# Patient Record
Sex: MALE | Race: OTHER | HISPANIC OR LATINO | URBAN - METROPOLITAN AREA
[De-identification: names, ages, dates, MRNs, and addresses within clinical notes are randomized per-mention and may not be internally consistent; named-entity substitution may affect disease eponyms.]

---

## 2019-05-22 ENCOUNTER — EMERGENCY (EMERGENCY)
Facility: HOSPITAL | Age: 39
LOS: 1 days | Discharge: HOME | End: 2019-05-22
Attending: EMERGENCY MEDICINE | Admitting: EMERGENCY MEDICINE
Payer: OTHER MISCELLANEOUS

## 2019-05-22 VITALS
TEMPERATURE: 98 F | SYSTOLIC BLOOD PRESSURE: 179 MMHG | RESPIRATION RATE: 16 BRPM | OXYGEN SATURATION: 98 % | DIASTOLIC BLOOD PRESSURE: 115 MMHG | HEART RATE: 78 BPM

## 2019-05-22 VITALS
SYSTOLIC BLOOD PRESSURE: 165 MMHG | TEMPERATURE: 98 F | DIASTOLIC BLOOD PRESSURE: 94 MMHG | OXYGEN SATURATION: 99 % | HEART RATE: 79 BPM | RESPIRATION RATE: 16 BRPM

## 2019-05-22 LAB
ALBUMIN SERPL ELPH-MCNC: 4.6 G/DL — SIGNIFICANT CHANGE UP (ref 3.3–5)
ALP SERPL-CCNC: 106 U/L — SIGNIFICANT CHANGE UP (ref 40–120)
ALT FLD-CCNC: 74 U/L — HIGH (ref 4–41)
ANION GAP SERPL CALC-SCNC: 17 MMO/L — HIGH (ref 7–14)
AST SERPL-CCNC: 62 U/L — HIGH (ref 4–40)
BASE EXCESS BLDV CALC-SCNC: 4.1 MMOL/L — SIGNIFICANT CHANGE UP
BASOPHILS # BLD AUTO: 0.09 K/UL — SIGNIFICANT CHANGE UP (ref 0–0.2)
BASOPHILS NFR BLD AUTO: 0.8 % — SIGNIFICANT CHANGE UP (ref 0–2)
BILIRUB SERPL-MCNC: 0.3 MG/DL — SIGNIFICANT CHANGE UP (ref 0.2–1.2)
BLOOD GAS VENOUS - CREATININE: 0.68 MG/DL — SIGNIFICANT CHANGE UP (ref 0.5–1.3)
BUN SERPL-MCNC: 14 MG/DL — SIGNIFICANT CHANGE UP (ref 7–23)
CALCIUM SERPL-MCNC: 10.1 MG/DL — SIGNIFICANT CHANGE UP (ref 8.4–10.5)
CHLORIDE BLDV-SCNC: 103 MMOL/L — SIGNIFICANT CHANGE UP (ref 96–108)
CHLORIDE SERPL-SCNC: 99 MMOL/L — SIGNIFICANT CHANGE UP (ref 98–107)
CO2 SERPL-SCNC: 26 MMOL/L — SIGNIFICANT CHANGE UP (ref 22–31)
CREAT SERPL-MCNC: 0.75 MG/DL — SIGNIFICANT CHANGE UP (ref 0.5–1.3)
EOSINOPHIL # BLD AUTO: 0.2 K/UL — SIGNIFICANT CHANGE UP (ref 0–0.5)
EOSINOPHIL NFR BLD AUTO: 1.7 % — SIGNIFICANT CHANGE UP (ref 0–6)
GAS PNL BLDV: 142 MMOL/L — SIGNIFICANT CHANGE UP (ref 136–146)
GLUCOSE BLDV-MCNC: 101 MG/DL — HIGH (ref 70–99)
GLUCOSE SERPL-MCNC: 104 MG/DL — HIGH (ref 70–99)
HCO3 BLDV-SCNC: 27 MMOL/L — SIGNIFICANT CHANGE UP (ref 20–27)
HCT VFR BLD CALC: 47.9 % — SIGNIFICANT CHANGE UP (ref 39–50)
HCT VFR BLDV CALC: 48.5 % — SIGNIFICANT CHANGE UP (ref 39–51)
HGB BLD-MCNC: 15.4 G/DL — SIGNIFICANT CHANGE UP (ref 13–17)
HGB BLDV-MCNC: 15.8 G/DL — SIGNIFICANT CHANGE UP (ref 13–17)
IMM GRANULOCYTES NFR BLD AUTO: 0.4 % — SIGNIFICANT CHANGE UP (ref 0–1.5)
LACTATE BLDV-MCNC: 2.6 MMOL/L — HIGH (ref 0.5–2)
LYMPHOCYTES # BLD AUTO: 3.54 K/UL — HIGH (ref 1–3.3)
LYMPHOCYTES # BLD AUTO: 30.6 % — SIGNIFICANT CHANGE UP (ref 13–44)
MCHC RBC-ENTMCNC: 30.1 PG — SIGNIFICANT CHANGE UP (ref 27–34)
MCHC RBC-ENTMCNC: 32.2 % — SIGNIFICANT CHANGE UP (ref 32–36)
MCV RBC AUTO: 93.7 FL — SIGNIFICANT CHANGE UP (ref 80–100)
MONOCYTES # BLD AUTO: 0.71 K/UL — SIGNIFICANT CHANGE UP (ref 0–0.9)
MONOCYTES NFR BLD AUTO: 6.1 % — SIGNIFICANT CHANGE UP (ref 2–14)
NEUTROPHILS # BLD AUTO: 6.97 K/UL — SIGNIFICANT CHANGE UP (ref 1.8–7.4)
NEUTROPHILS NFR BLD AUTO: 60.4 % — SIGNIFICANT CHANGE UP (ref 43–77)
NRBC # FLD: 0 K/UL — SIGNIFICANT CHANGE UP (ref 0–0)
PCO2 BLDV: 49 MMHG — SIGNIFICANT CHANGE UP (ref 41–51)
PH BLDV: 7.39 PH — SIGNIFICANT CHANGE UP (ref 7.32–7.43)
PLATELET # BLD AUTO: 346 K/UL — SIGNIFICANT CHANGE UP (ref 150–400)
PMV BLD: 10.4 FL — SIGNIFICANT CHANGE UP (ref 7–13)
PO2 BLDV: 68 MMHG — HIGH (ref 35–40)
POTASSIUM BLDV-SCNC: 3.8 MMOL/L — SIGNIFICANT CHANGE UP (ref 3.4–4.5)
POTASSIUM SERPL-MCNC: 4.2 MMOL/L — SIGNIFICANT CHANGE UP (ref 3.5–5.3)
POTASSIUM SERPL-SCNC: 4.2 MMOL/L — SIGNIFICANT CHANGE UP (ref 3.5–5.3)
PROT SERPL-MCNC: 8 G/DL — SIGNIFICANT CHANGE UP (ref 6–8.3)
RBC # BLD: 5.11 M/UL — SIGNIFICANT CHANGE UP (ref 4.2–5.8)
RBC # FLD: 12 % — SIGNIFICANT CHANGE UP (ref 10.3–14.5)
SAO2 % BLDV: 93.4 % — HIGH (ref 60–85)
SODIUM SERPL-SCNC: 142 MMOL/L — SIGNIFICANT CHANGE UP (ref 135–145)
WBC # BLD: 11.56 K/UL — HIGH (ref 3.8–10.5)
WBC # FLD AUTO: 11.56 K/UL — HIGH (ref 3.8–10.5)

## 2019-05-22 PROCEDURE — 76604 US EXAM CHEST: CPT | Mod: 26

## 2019-05-22 PROCEDURE — 99284 EMERGENCY DEPT VISIT MOD MDM: CPT

## 2019-05-22 RX ORDER — SODIUM CHLORIDE 9 MG/ML
1000 INJECTION INTRAMUSCULAR; INTRAVENOUS; SUBCUTANEOUS ONCE
Refills: 0 | Status: COMPLETED | OUTPATIENT
Start: 2019-05-22 | End: 2019-05-22

## 2019-05-22 RX ORDER — AMPICILLIN SODIUM AND SULBACTAM SODIUM 250; 125 MG/ML; MG/ML
3 INJECTION, POWDER, FOR SUSPENSION INTRAMUSCULAR; INTRAVENOUS ONCE
Refills: 0 | Status: DISCONTINUED | OUTPATIENT
Start: 2019-05-22 | End: 2019-05-26

## 2019-05-22 RX ORDER — AMPICILLIN SODIUM AND SULBACTAM SODIUM 250; 125 MG/ML; MG/ML
3 INJECTION, POWDER, FOR SUSPENSION INTRAMUSCULAR; INTRAVENOUS EVERY 6 HOURS
Refills: 0 | Status: DISCONTINUED | OUTPATIENT
Start: 2019-05-22 | End: 2019-05-22

## 2019-05-22 RX ADMIN — AMPICILLIN SODIUM AND SULBACTAM SODIUM 200 GRAM(S): 250; 125 INJECTION, POWDER, FOR SUSPENSION INTRAMUSCULAR; INTRAVENOUS at 16:26

## 2019-05-22 RX ADMIN — SODIUM CHLORIDE 1000 MILLILITER(S): 9 INJECTION INTRAMUSCULAR; INTRAVENOUS; SUBCUTANEOUS at 17:08

## 2019-05-22 NOTE — ED ADULT TRIAGE NOTE - CHIEF COMPLAINT QUOTE
states " I got bit by a hoarse on my right breast area a week ago and my pressure is high " states " I got bit by a hoarse on my right breast area a week ago and my pressure is high " denies head ache. denies any other [id number]. states " I got bit by a horse on my right breast area a week ago and my pressure is high " denies head ache. denies any other [id number].

## 2019-05-22 NOTE — ED PROVIDER NOTE - NSFOLLOWUPINSTRUCTIONS_ED_ALL_ED_FT
PLEASE MAKE SURE THAT YOU APPLY WARM COMPRESSES TO THE AREA MULTIPLE TIMES A DAY, TAKE ANTIBIOTICS (AUGMENTIN) AS WELL AS PAIN MEDICATIONS AS NEEDED; FOLLOW UP WITH SURGERY CLINIC AT [facility] WITHIN NEXT 3-4DAYS; CALL [phone];

## 2019-05-22 NOTE — CONSULT NOTE ADULT - SUBJECTIVE AND OBJECTIVE BOX
General Surgery Consult      Consulting surgical team: B  Consulting attending: Dr. Bacon       HPI: Vinicius Santos is a 38 y.o. man with history of HTN who presented to the ED on 5/22 complaining of swelling and pain to the right side of his anterior chest wall at the site of a horse bite.    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 The patient states that he knows the horse and it is up-to-date on all vaccinations. The patient denies that the bite produced any blood or broke the skin.     Since that time, the patient states he has had pain at the area and that the degree of associated swelling is improving. The patient denies any fever, chills, fatigue, or other symptoms.       PAST MEDICAL HISTORY:  No pertinent past medical history      PAST SURGICAL HISTORY:  No significant past surgical history      MEDICATIONS:  None      ALLERGIES:  No Known Allergies      VITALS & I/Os:  Vital Signs Last 24 Hrs  T(C): 36.8 (22 May 2019 16:37), Max: 36.8 (22 May 2019 16:37)  T(F): 98.2 (22 May 2019 16:37), Max: 98.2 (22 May 2019 16:37)  HR: 79 (22 May 2019 16:37) (78 - 79)  BP: 165/94 (22 May 2019 16:37) (160/110 - 179/115)  RR: 16 (22 May 2019 16:37) (16 - 16)  SpO2: 99% (22 May 2019 16:37) (98% - 99%)      PHYSICAL EXAM:  General: No acute distress  Respiratory: Nonlabored  Cardiovascular: normotensive, regular rate  Chest: ecchymosis inferior to right areola, tenderness superior to right areola with underlying induration and no overlying skin changes, no palpable fluctuance   Extremities: Warm      LABS:                        15.4   11.56 )-----------( 346      ( 22 May 2019 15:58 )             47.9     05-22    142  |  99  |  14  ----------------------------<  104<H>  4.2   |  26  |  0.75    Ca    10.1      22 May 2019 15:58    TPro  8.0  /  Alb  4.6  /  TBili  0.3  /  DBili  x   /  AST  62<H>  /  ALT  74<H>  /  AlkPhos  106  05-22    Lactate:  05-22 @ 15:58  2.6      IMAGING:  US Chest (05.22.19 @ 16:43)   There is a fluid collection in the right breast with surrounding   inflammation. The fluid collection measures 3.7 x 0.9 x 3.5 cm.    IMPRESSION:  Fluid collection and inflammation involving the right breast.

## 2019-05-22 NOTE — ED PROVIDER NOTE - PHYSICAL EXAMINATION
Rt breast - 5cm x 5cm area of induration with ecchymotic skin changes, no pointing, no spontaneous drainage or drainage from the nipple

## 2019-05-22 NOTE — CONSULT NOTE ADULT - ASSESSMENT
Vinicius Santos is a 38 y.o. man with history of HTN who presented to the ED on 5/22 complaining of swelling and pain to the right side of his anterior chest wall at the site of a horse bite. Fluid collection seen on US likely hematoma vs seroma; no sign of infection.    Plan:  - Given traumatic nature of fluid collection and lack of infectious symptoms, no indication for drainage  - NSAIDs  - Cold compresses  - No contraindication to discharge from a surgical perspective  - Plan discussed with Dr. Jordi Parker, PGY2  [id number]

## 2019-05-22 NOTE — ED PROVIDER NOTE - PROGRESS NOTE DETAILS
YANICK James - pt seen by surgery - the collection is not abscess ut resolving hematoma - will dc with warm compresses, AUgmentin, NSAIDs, surgery clinic f/u within 1 week/ YANICK James - pt seen by surgery - the collection is not abscess ut resolving hematoma - will dc with warm compresses, AUgmentin, NSAIDs, surgery clinic f/u within 1 week; pt a;lso given general clinic f/u to start seeing pcp, bp control stressed to the pt;

## 2019-05-22 NOTE — ED PROVIDER NOTE - ATTENDING CONTRIBUTION TO CARE
DR. WHITLEY, ATTENDING MD-  I performed a face to face bedside interview with patient regarding history of present illness, review of symptoms and past medical history. I completed an independent physical exam.  I have discussed patient's plan of care with PA.   Documentation as above in the note.    37 y/o male h/o htn no meds, etoh abuse 6-7 beers daily here for evaluation of horse bite.  Per pt, he works at [employer].  A horse bit him on his right breast 10 days prior.  Since that time has had worsening pain.  No fever.  No drainage from nipple or breast.  Taking nsaids with some relief.  Tetanus utd.  Last etoh last night.  Denies f/c, ha, neck stiffness, cp, sob, cough, abd pain, n/v/d, dysuria, rash.  Afebrile, vs wnl, anxious, ctabil, s1s2 rrr no m/r/g, gynecomastia, right breast ttp with induration, minimal overlying erythema, no fluctuance appreciated, abd soft non ttp no r/g, no cva tenderness b/l, no leg swelling b/l, no rash.  Breast abscess vs mastitis, no clinical signs of etoh w/d at this time, will obtain labs, u/s breast, give augmentin, reassess.

## 2019-05-22 NOTE — ED PROVIDER NOTE - OBJECTIVE STATEMENT
37 YO M told has HTN (does not take medication) here s/p horse bite. States a horse bit his right breast Sourav May 12th, after giving pt an oral medication. Horse has been acting his normal baseline behavior. Never bit anyone else. Vaccinations for horse is up to date. Horse is part of [employer]. Here due to increased pain. Denies fever, chills, HA, or CP. Tetanus vaccine was 7 years ago. Naproxen taken at home 500 mg yesterday. NKDA. ETOH 6 to 7 beers daily. Last drink was yesterday. Sometimes develops shakes hen he does not drink. Does not follow with a primary doctor. 39 YO M told has HTN (does not take medication) here s/p horse bite. States a horse bit his right breast Sourav May 12th, after giving pt an oral medication. Horse has been acting his normal baseline behavior. Never bit anyone else. Vaccinations for horse is up to date. Horse is part of [employer]. Here due to increased pain. Denies fever, chills, HA, or CP. Tetanus vaccine was 7 years ago. Naproxen taken at home 500 mg yesterday. NKDA. ETOH 6 to 7 beers daily. Last drink was yesterday. Sometimes develops shakes hen he does not drink. Does not follow with a primary doctor.  ID # [id number]. 37 YO M told has HTN (does not take medication) here s/p horse bite. States a horse bit his right breast Sourav May 12th, after giving pt an oral medication (pt works at [employer]). Horse has been acting his normal baseline behavior. Never bit anyone else. Vaccinations for horse is up to date. Horse is part of [employer]. Here due to increased pain. Denies fever, chills, HA, or CP. Tetanus vaccine was 7 years ago. Naproxen taken at home 500 mg yesterday. NKDA. ETOH 6 to 7 beers daily. Last drink was yesterday. . Does not follow with a primary doctor.  ID # [id number]. (-) fever, chills, draine from the nipple, denies hx of immunocompromised state;

## 2019-05-22 NOTE — ED PROVIDER NOTE - CLINICAL SUMMARY MEDICAL DECISION MAKING FREE TEXT BOX
s/p animal bite to RT breast, now with increased pain, concern for abscess - labs, US, pain control; pt noted to be hypertensive in the ER - has hx of HTN but since no PMD never started medications; denies cp, sob, palpitations, HA or dizziness; daily etoh Drinker, last drink 8pm last night, no signs/symptoms of withdrawal noted;

## 2020-12-28 ENCOUNTER — INPATIENT (INPATIENT)
Facility: HOSPITAL | Age: 40
LOS: 1 days | Discharge: HOME | End: 2020-12-30
Attending: INTERNAL MEDICINE | Admitting: INTERNAL MEDICINE
Payer: MEDICAID

## 2020-12-28 VITALS
OXYGEN SATURATION: 98 % | WEIGHT: 229.94 LBS | RESPIRATION RATE: 18 BRPM | SYSTOLIC BLOOD PRESSURE: 157 MMHG | DIASTOLIC BLOOD PRESSURE: 113 MMHG | HEIGHT: 65 IN | HEART RATE: 90 BPM | TEMPERATURE: 99 F

## 2020-12-28 DIAGNOSIS — F10.230 ALCOHOL DEPENDENCE WITH WITHDRAWAL, UNCOMPLICATED: ICD-10-CM

## 2020-12-28 DIAGNOSIS — E87.6 HYPOKALEMIA: ICD-10-CM

## 2020-12-28 DIAGNOSIS — K76.0 FATTY (CHANGE OF) LIVER, NOT ELSEWHERE CLASSIFIED: ICD-10-CM

## 2020-12-28 DIAGNOSIS — R79.89 OTHER SPECIFIED ABNORMAL FINDINGS OF BLOOD CHEMISTRY: ICD-10-CM

## 2020-12-28 PROBLEM — Z78.9 OTHER SPECIFIED HEALTH STATUS: Chronic | Status: ACTIVE | Noted: 2019-05-22

## 2020-12-28 LAB
ALBUMIN SERPL ELPH-MCNC: 3.6 G/DL — SIGNIFICANT CHANGE UP (ref 3.3–5)
ALBUMIN SERPL ELPH-MCNC: 4 G/DL — SIGNIFICANT CHANGE UP (ref 3.3–5)
ALP SERPL-CCNC: 109 U/L — SIGNIFICANT CHANGE UP (ref 40–120)
ALP SERPL-CCNC: 125 U/L — HIGH (ref 40–120)
ALT FLD-CCNC: 162 U/L — HIGH (ref 12–78)
ALT FLD-CCNC: 192 U/L — HIGH (ref 12–78)
ANION GAP SERPL CALC-SCNC: 10 MMOL/L — SIGNIFICANT CHANGE UP (ref 5–17)
ANION GAP SERPL CALC-SCNC: 8 MMOL/L — SIGNIFICANT CHANGE UP (ref 5–17)
APPEARANCE UR: CLEAR — SIGNIFICANT CHANGE UP
APTT BLD: 31.8 SEC — SIGNIFICANT CHANGE UP (ref 27.5–35.5)
AST SERPL-CCNC: 243 U/L — HIGH (ref 15–37)
AST SERPL-CCNC: 324 U/L — HIGH (ref 15–37)
BACTERIA # UR AUTO: ABNORMAL
BILIRUB DIRECT SERPL-MCNC: 1.13 MG/DL — HIGH (ref 0.05–0.2)
BILIRUB INDIRECT FLD-MCNC: 1.1 MG/DL — HIGH (ref 0.2–1)
BILIRUB SERPL-MCNC: 2.2 MG/DL — HIGH (ref 0.2–1.2)
BILIRUB SERPL-MCNC: 2.6 MG/DL — HIGH (ref 0.2–1.2)
BILIRUB UR-MCNC: NEGATIVE — SIGNIFICANT CHANGE UP
BUN SERPL-MCNC: 7 MG/DL — SIGNIFICANT CHANGE UP (ref 7–23)
BUN SERPL-MCNC: 7 MG/DL — SIGNIFICANT CHANGE UP (ref 7–23)
CALCIUM SERPL-MCNC: 9.1 MG/DL — SIGNIFICANT CHANGE UP (ref 8.5–10.1)
CALCIUM SERPL-MCNC: 9.5 MG/DL — SIGNIFICANT CHANGE UP (ref 8.5–10.1)
CHLORIDE SERPL-SCNC: 92 MMOL/L — LOW (ref 96–108)
CHLORIDE SERPL-SCNC: 97 MMOL/L — SIGNIFICANT CHANGE UP (ref 96–108)
CO2 SERPL-SCNC: 29 MMOL/L — SIGNIFICANT CHANGE UP (ref 22–31)
CO2 SERPL-SCNC: 29 MMOL/L — SIGNIFICANT CHANGE UP (ref 22–31)
COLOR SPEC: YELLOW — SIGNIFICANT CHANGE UP
CREAT SERPL-MCNC: 0.73 MG/DL — SIGNIFICANT CHANGE UP (ref 0.5–1.3)
CREAT SERPL-MCNC: 0.8 MG/DL — SIGNIFICANT CHANGE UP (ref 0.5–1.3)
DIFF PNL FLD: NEGATIVE — SIGNIFICANT CHANGE UP
EPI CELLS # UR: SIGNIFICANT CHANGE UP
ETHANOL SERPL-MCNC: <10 MG/DL — SIGNIFICANT CHANGE UP (ref 0–10)
GLUCOSE SERPL-MCNC: 119 MG/DL — HIGH (ref 70–99)
GLUCOSE SERPL-MCNC: 92 MG/DL — SIGNIFICANT CHANGE UP (ref 70–99)
GLUCOSE UR QL: NEGATIVE MG/DL — SIGNIFICANT CHANGE UP
HCT VFR BLD CALC: 48.8 % — SIGNIFICANT CHANGE UP (ref 39–50)
HGB BLD-MCNC: 16.4 G/DL — SIGNIFICANT CHANGE UP (ref 13–17)
INR BLD: 1.09 RATIO — SIGNIFICANT CHANGE UP (ref 0.88–1.16)
KETONES UR-MCNC: NEGATIVE — SIGNIFICANT CHANGE UP
LEUKOCYTE ESTERASE UR-ACNC: NEGATIVE — SIGNIFICANT CHANGE UP
LIDOCAIN IGE QN: 360 U/L — SIGNIFICANT CHANGE UP (ref 73–393)
MAGNESIUM SERPL-MCNC: 1.5 MG/DL — LOW (ref 1.6–2.6)
MAGNESIUM SERPL-MCNC: 1.8 MG/DL — SIGNIFICANT CHANGE UP (ref 1.6–2.6)
MCHC RBC-ENTMCNC: 31.5 PG — SIGNIFICANT CHANGE UP (ref 27–34)
MCHC RBC-ENTMCNC: 33.6 GM/DL — SIGNIFICANT CHANGE UP (ref 32–36)
MCV RBC AUTO: 93.8 FL — SIGNIFICANT CHANGE UP (ref 80–100)
NITRITE UR-MCNC: NEGATIVE — SIGNIFICANT CHANGE UP
NRBC # BLD: 0 /100 WBCS — SIGNIFICANT CHANGE UP (ref 0–0)
PH UR: 8 — SIGNIFICANT CHANGE UP (ref 5–8)
PHOSPHATE SERPL-MCNC: 2.7 MG/DL — SIGNIFICANT CHANGE UP (ref 2.5–4.5)
PHOSPHATE SERPL-MCNC: 3.5 MG/DL — SIGNIFICANT CHANGE UP (ref 2.5–4.5)
PLATELET # BLD AUTO: 151 K/UL — SIGNIFICANT CHANGE UP (ref 150–400)
POTASSIUM SERPL-MCNC: 3.2 MMOL/L — LOW (ref 3.5–5.3)
POTASSIUM SERPL-MCNC: 3.5 MMOL/L — SIGNIFICANT CHANGE UP (ref 3.5–5.3)
POTASSIUM SERPL-SCNC: 3.2 MMOL/L — LOW (ref 3.5–5.3)
POTASSIUM SERPL-SCNC: 3.5 MMOL/L — SIGNIFICANT CHANGE UP (ref 3.5–5.3)
PROT SERPL-MCNC: 7.7 GM/DL — SIGNIFICANT CHANGE UP (ref 6–8.3)
PROT SERPL-MCNC: 8.6 GM/DL — HIGH (ref 6–8.3)
PROT UR-MCNC: 15 MG/DL
PROTHROM AB SERPL-ACNC: 12.6 SEC — SIGNIFICANT CHANGE UP (ref 10.6–13.6)
RBC # BLD: 5.2 M/UL — SIGNIFICANT CHANGE UP (ref 4.2–5.8)
RBC # FLD: 11.9 % — SIGNIFICANT CHANGE UP (ref 10.3–14.5)
SARS-COV-2 RNA SPEC QL NAA+PROBE: SIGNIFICANT CHANGE UP
SODIUM SERPL-SCNC: 131 MMOL/L — LOW (ref 135–145)
SODIUM SERPL-SCNC: 134 MMOL/L — LOW (ref 135–145)
SP GR SPEC: 1.01 — SIGNIFICANT CHANGE UP (ref 1.01–1.02)
TROPONIN I SERPL-MCNC: <.015 NG/ML — SIGNIFICANT CHANGE UP (ref 0.01–0.04)
UROBILINOGEN FLD QL: 4 MG/DL
WBC # BLD: 4.31 K/UL — SIGNIFICANT CHANGE UP (ref 3.8–10.5)
WBC # FLD AUTO: 4.31 K/UL — SIGNIFICANT CHANGE UP (ref 3.8–10.5)
WBC UR QL: SIGNIFICANT CHANGE UP

## 2020-12-28 PROCEDURE — 74177 CT ABD & PELVIS W/CONTRAST: CPT | Mod: 26,MA

## 2020-12-28 PROCEDURE — 71045 X-RAY EXAM CHEST 1 VIEW: CPT | Mod: 26

## 2020-12-28 PROCEDURE — 99223 1ST HOSP IP/OBS HIGH 75: CPT

## 2020-12-28 PROCEDURE — 93010 ELECTROCARDIOGRAM REPORT: CPT

## 2020-12-28 PROCEDURE — 99285 EMERGENCY DEPT VISIT HI MDM: CPT

## 2020-12-28 RX ORDER — PANTOPRAZOLE SODIUM 20 MG/1
40 TABLET, DELAYED RELEASE ORAL DAILY
Refills: 0 | Status: DISCONTINUED | OUTPATIENT
Start: 2020-12-28 | End: 2020-12-30

## 2020-12-28 RX ORDER — FAMOTIDINE 10 MG/ML
20 INJECTION INTRAVENOUS ONCE
Refills: 0 | Status: COMPLETED | OUTPATIENT
Start: 2020-12-28 | End: 2020-12-28

## 2020-12-28 RX ORDER — DEXTROSE MONOHYDRATE, SODIUM CHLORIDE, AND POTASSIUM CHLORIDE 50; .745; 4.5 G/1000ML; G/1000ML; G/1000ML
1000 INJECTION, SOLUTION INTRAVENOUS
Refills: 0 | Status: DISCONTINUED | OUTPATIENT
Start: 2020-12-28 | End: 2020-12-30

## 2020-12-28 RX ORDER — AZITHROMYCIN 500 MG/1
1000 TABLET, FILM COATED ORAL ONCE
Refills: 0 | Status: COMPLETED | OUTPATIENT
Start: 2020-12-28 | End: 2020-12-28

## 2020-12-28 RX ORDER — ACETAMINOPHEN 500 MG
650 TABLET ORAL EVERY 6 HOURS
Refills: 0 | Status: DISCONTINUED | OUTPATIENT
Start: 2020-12-28 | End: 2020-12-30

## 2020-12-28 RX ORDER — CEFTRIAXONE 500 MG/1
250 INJECTION, POWDER, FOR SOLUTION INTRAMUSCULAR; INTRAVENOUS ONCE
Refills: 0 | Status: COMPLETED | OUTPATIENT
Start: 2020-12-28 | End: 2020-12-28

## 2020-12-28 RX ORDER — SODIUM CHLORIDE 9 MG/ML
1000 INJECTION INTRAMUSCULAR; INTRAVENOUS; SUBCUTANEOUS ONCE
Refills: 0 | Status: COMPLETED | OUTPATIENT
Start: 2020-12-28 | End: 2020-12-28

## 2020-12-28 RX ADMIN — CEFTRIAXONE 250 MILLIGRAM(S): 500 INJECTION, POWDER, FOR SOLUTION INTRAMUSCULAR; INTRAVENOUS at 06:19

## 2020-12-28 RX ADMIN — PANTOPRAZOLE SODIUM 40 MILLIGRAM(S): 20 TABLET, DELAYED RELEASE ORAL at 15:22

## 2020-12-28 RX ADMIN — Medication 30 MILLILITER(S): at 06:19

## 2020-12-28 RX ADMIN — FAMOTIDINE 20 MILLIGRAM(S): 10 INJECTION INTRAVENOUS at 06:19

## 2020-12-28 RX ADMIN — AZITHROMYCIN 1000 MILLIGRAM(S): 500 TABLET, FILM COATED ORAL at 06:19

## 2020-12-28 RX ADMIN — Medication 50 MILLIGRAM(S): at 18:55

## 2020-12-28 RX ADMIN — Medication 50 MILLIGRAM(S): at 15:21

## 2020-12-28 RX ADMIN — DEXTROSE MONOHYDRATE, SODIUM CHLORIDE, AND POTASSIUM CHLORIDE 80 MILLILITER(S): 50; .745; 4.5 INJECTION, SOLUTION INTRAVENOUS at 23:04

## 2020-12-28 RX ADMIN — SODIUM CHLORIDE 1000 MILLILITER(S): 9 INJECTION INTRAMUSCULAR; INTRAVENOUS; SUBCUTANEOUS at 06:19

## 2020-12-28 RX ADMIN — Medication 2 MILLIGRAM(S): at 06:19

## 2020-12-28 NOTE — ED PROVIDER NOTE - CLINICAL SUMMARY MEDICAL DECISION MAKING FREE TEXT BOX
patient clinically in etoh withdrawal, will treat with ativan. patient clinically in etoh withdrawal, will treat with ativan. consider pancreatitis, alcohol induced gastritis, appendicitis. empiric treatment for STD. patient declining HIV testing. as patinet has lost sense of smell will eval for covid, non tahcypenic or hypoxic in ED.

## 2020-12-28 NOTE — ED PROVIDER NOTE - PROGRESS NOTE DETAILS
ct show hepatic steatosis, pt admitted for alcohol withdrawal pt signed out to me from dr trevizo, pt presented with alcohol withdrawal (ciwa-9) and abdominal pain, given ativan, ct pending

## 2020-12-28 NOTE — ED ADULT NURSE REASSESSMENT NOTE - NS ED NURSE REASSESS COMMENT FT1
Received pt in stable condition. Pt denies any  complaints however pt is displaying hand tremors. continue to  monitor pt.

## 2020-12-28 NOTE — ED ADULT NURSE NOTE - OBJECTIVE STATEMENT
pt c/o abd pain, (points to epigastric and also points RLQ),  pt unable to eat/drink x 10days.  N/V/D.  denies blood in diarrhea,  "sometimes urine is orange".  "my lungs feel vincent strange".  +cough with white phelgm.  denies chest pain pt c/o abd pain, (points to epigastric and also points RLQ),  pt unable to eat/drink x 10days.  N/V/D.  denies blood in diarrhea,  "sometimes urine is orange".  "my lungs feel vincent strange".  +cough with white phelgm.  denies chest pain.  pt admits to doing cocaine 2 weeks ago.

## 2020-12-28 NOTE — ED PROVIDER NOTE - OBJECTIVE STATEMENT
40m pmhx H. pylori with progressively worsening abdominal pain. points toward his epigastrum and RLQ. +nausea and vomiting. no blood in vomit. +diarrhea. no blood in diarrhea. no cp or sob or fever. familia also reports occasional dysuria and says it feels like he has chlamydia again. drinks daily and has not had a drink since last night.

## 2020-12-28 NOTE — ED ADULT TRIAGE NOTE - CHIEF COMPLAINT QUOTE
pt c/o abd pain, N/V/D x 5 days,  points to epigastric area.  +cough/white phlegm,  c/o no sense of smell,  +sense of taste,  denies fever,  pt states he had covid in April and June.  pt took tylenol 1 tab at 0430hrs pt c/o abd pain, N/V/D x 5 days,  points to epigastric area.  +cough/white phlegm,  c/o no sense of smell,  +sense of taste,  denies fever,  pt states he had covid in April and June.  pt took tylenol 1 tab at 0430hrs.  pt also admits to being a daily drinker, 2-3 whiskeys daily, last drink Sunday night.

## 2020-12-28 NOTE — ED PROVIDER NOTE - ENMT, MLM
Airway patent, Nasal mucosa clear. Mouth with normal mucosa with high frequency tongue fasciculations

## 2020-12-28 NOTE — ED ADULT NURSE NOTE - CHIEF COMPLAINT QUOTE
pt c/o abd pain, N/V/D x 5 days,  points to epigastric area.  +cough/white phlegm,  c/o no sense of smell,  +sense of taste,  denies fever,  pt states he had covid in April and June.  pt took tylenol 1 tab at 0430hrs.  pt also admits to being a daily drinker, 2-3 whiskeys daily, last drink Sunday night.

## 2020-12-28 NOTE — ED ADULT NURSE REASSESSMENT NOTE - NS ED NURSE REASSESS COMMENT FT1
Patient alert and oriented x 4; able to make needs known. Patient without complaint. Safety checks completed. Patient safety maintained. Patient turned and positioned self independently. IV site assessed  and remain within defined limits. Continue to monitor patient.

## 2020-12-28 NOTE — H&P ADULT - NSHPPHYSICALEXAM_GEN_ALL_CORE
ICU Vital Signs Last 24 Hrs  T(C): 37 (28 Dec 2020 11:38), Max: 37.3 (28 Dec 2020 05:31)  T(F): 98.6 (28 Dec 2020 11:38), Max: 99.2 (28 Dec 2020 05:31)  HR: 94 (28 Dec 2020 11:38) (80 - 95)  BP: 158/101 (28 Dec 2020 11:38) (130/90 - 159/97)  BP(mean): --  ABP: --  ABP(mean): --  RR: 18 (28 Dec 2020 11:38) (16 - 18)  SpO2: 95% (28 Dec 2020 11:38) (95% - 98%)  GENERAL: NAD well-developed  HEAD:  Atraumatic, Normocephalic  EYES: EOMI, PERRLA, conjunctiva and sclera clear  ENMT: No tonsillar erythema, exudates, or enlargement; Moist mucous membranes, Good dentition, No lesions  NECK: Supple, No JVD, Normal thyroid  NERVOUS SYSTEM:  Alert & Oriented X3, Good concentration; Motor Strength 5/5 B/L upper and lower extremities; DTRs 2+ intact and symmetric  CHEST/LUNG: Clear to percussion bilaterally; No rales, rhonchi, wheezing, or rubs  HEART: Regular rate and rhythm; No murmurs, rubs, or gallops  ABDOMEN: Soft, Nontender, Nondistended; Bowel sounds present  EXTREMITIES:  2+ Peripheral Pulses, No clubbing, cyanosis, or edema  LYMPH: No lymphadenopathy   SKIN: No rashes or lesions

## 2020-12-28 NOTE — ED ADULT NURSE NOTE - NS ED NURSE LEVEL OF CONSCIOUSNESS ORIENTATION
central line located in the superior vena cava/no pneumothorax
central line located in the superior vena cava/no pneumothorax
Oriented - self; Oriented - place; Oriented - time

## 2020-12-28 NOTE — ED ADULT NURSE NOTE - ED STAT RN HANDOFF DETAILS 3
Report received from ROBBY Rodriguez at 7pm. Assessment available on [facility]. will continue to monitor.

## 2020-12-29 LAB
ANION GAP SERPL CALC-SCNC: 8 MMOL/L — SIGNIFICANT CHANGE UP (ref 5–17)
BUN SERPL-MCNC: 10 MG/DL — SIGNIFICANT CHANGE UP (ref 7–23)
CALCIUM SERPL-MCNC: 9 MG/DL — SIGNIFICANT CHANGE UP (ref 8.5–10.1)
CHLORIDE SERPL-SCNC: 99 MMOL/L — SIGNIFICANT CHANGE UP (ref 96–108)
CO2 SERPL-SCNC: 29 MMOL/L — SIGNIFICANT CHANGE UP (ref 22–31)
CREAT SERPL-MCNC: 0.7 MG/DL — SIGNIFICANT CHANGE UP (ref 0.5–1.3)
GLUCOSE SERPL-MCNC: 79 MG/DL — SIGNIFICANT CHANGE UP (ref 70–99)
HCT VFR BLD CALC: 45 % — SIGNIFICANT CHANGE UP (ref 39–50)
HGB BLD-MCNC: 15.4 G/DL — SIGNIFICANT CHANGE UP (ref 13–17)
MAGNESIUM SERPL-MCNC: 2.1 MG/DL — SIGNIFICANT CHANGE UP (ref 1.6–2.6)
MCHC RBC-ENTMCNC: 32.5 PG — SIGNIFICANT CHANGE UP (ref 27–34)
MCHC RBC-ENTMCNC: 34.2 GM/DL — SIGNIFICANT CHANGE UP (ref 32–36)
MCV RBC AUTO: 94.9 FL — SIGNIFICANT CHANGE UP (ref 80–100)
NRBC # BLD: 0 /100 WBCS — SIGNIFICANT CHANGE UP (ref 0–0)
PHOSPHATE SERPL-MCNC: 3.5 MG/DL — SIGNIFICANT CHANGE UP (ref 2.5–4.5)
PLATELET # BLD AUTO: 143 K/UL — LOW (ref 150–400)
POTASSIUM SERPL-MCNC: 3.4 MMOL/L — LOW (ref 3.5–5.3)
POTASSIUM SERPL-SCNC: 3.4 MMOL/L — LOW (ref 3.5–5.3)
RBC # BLD: 4.74 M/UL — SIGNIFICANT CHANGE UP (ref 4.2–5.8)
RBC # FLD: 12.1 % — SIGNIFICANT CHANGE UP (ref 10.3–14.5)
SARS-COV-2 IGG SERPL QL IA: NEGATIVE — SIGNIFICANT CHANGE UP
SARS-COV-2 IGM SERPL IA-ACNC: 0.17 INDEX — SIGNIFICANT CHANGE UP
SODIUM SERPL-SCNC: 136 MMOL/L — SIGNIFICANT CHANGE UP (ref 135–145)
WBC # BLD: 5.19 K/UL — SIGNIFICANT CHANGE UP (ref 3.8–10.5)
WBC # FLD AUTO: 5.19 K/UL — SIGNIFICANT CHANGE UP (ref 3.8–10.5)

## 2020-12-29 PROCEDURE — 99232 SBSQ HOSP IP/OBS MODERATE 35: CPT

## 2020-12-29 RX ADMIN — Medication 50 MILLIGRAM(S): at 22:08

## 2020-12-29 RX ADMIN — DEXTROSE MONOHYDRATE, SODIUM CHLORIDE, AND POTASSIUM CHLORIDE 80 MILLILITER(S): 50; .745; 4.5 INJECTION, SOLUTION INTRAVENOUS at 22:08

## 2020-12-29 RX ADMIN — Medication 50 MILLIGRAM(S): at 08:25

## 2020-12-29 RX ADMIN — Medication 50 MILLIGRAM(S): at 14:34

## 2020-12-29 RX ADMIN — Medication 50 MILLIGRAM(S): at 01:01

## 2020-12-29 RX ADMIN — DEXTROSE MONOHYDRATE, SODIUM CHLORIDE, AND POTASSIUM CHLORIDE 80 MILLILITER(S): 50; .745; 4.5 INJECTION, SOLUTION INTRAVENOUS at 17:52

## 2020-12-29 RX ADMIN — PANTOPRAZOLE SODIUM 40 MILLIGRAM(S): 20 TABLET, DELAYED RELEASE ORAL at 12:55

## 2020-12-29 NOTE — PROGRESS NOTE ADULT - SUBJECTIVE AND OBJECTIVE BOX
Patient is a 40y old  Male who presents with a chief complaint of     INTERVAL HPI/OVERNIGHT EVENTS: no events on bipap overnight, 2/2 sleep apnea     MEDICATIONS  (STANDING):  chlordiazePOXIDE   Oral   chlordiazePOXIDE 50 milliGRAM(s) Oral every 8 hours  pantoprazole  Injectable 40 milliGRAM(s) IV Push daily  sodium chloride 0.9% with potassium chloride 20 mEq/L 1000 milliLiter(s) (80 mL/Hr) IV Continuous <Continuous>    MEDICATIONS  (PRN):  acetaminophen   Tablet .. 650 milliGRAM(s) Oral every 6 hours PRN Temp greater or equal to 38C (100.4F), Mild Pain (1 - 3)      Allergies    No Known Allergies    Intolerances         Vital Signs Last 24 Hrs  T(C): 36.7 (29 Dec 2020 11:01), Max: 37.1 (28 Dec 2020 15:52)  T(F): 98 (29 Dec 2020 11:01), Max: 98.8 (28 Dec 2020 15:52)  HR: 76 (29 Dec 2020 11:01) (74 - 81)  BP: 127/84 (29 Dec 2020 11:01) (126/98 - 146/99)  BP(mean): --  RR: 17 (29 Dec 2020 11:01) (17 - 20)  SpO2: 95% (29 Dec 2020 11:01) (94% - 99%)    PHYSICAL EXAM:  GENERAL: NAD, well-groomed, well-developed  HEAD:  Atraumatic, Normocephalic  EYES: EOMI, PERRLA, conjunctiva and sclera clear  ENMT: No tonsillar erythema, exudates, or enlargement; Moist mucous membranes, Good dentition, No lesions  NECK: Supple, No JVD, Normal thyroid  NERVOUS SYSTEM:  Alert & Oriented X3, Good concentration; Motor Strength 5/5 B/L upper and lower extremities; DTRs 2+ intact and symmetric  CHEST/LUNG: Clear to percussion bilaterally; No rales, rhonchi, wheezing, or rubs  HEART: Regular rate and rhythm; No murmurs, rubs, or gallops  ABDOMEN: Soft, Nontender, Nondistended; Bowel sounds present  EXTREMITIES:  2+ Peripheral Pulses, No clubbing, cyanosis, or edema  LYMPH: No lymphadenopathy noted  SKIN: No rashes or lesions    LABS:                        15.4   5.19  )-----------( 143      ( 29 Dec 2020 05:37 )             45.0         136  |  99  |  10  ----------------------------<  79  3.4<L>   |  29  |  0.70    Ca    9.0      29 Dec 2020 05:37  Phos  3.5       Mg     2.1         TPro  7.7  /  Alb  3.6  /  TBili  2.2<H>  /  DBili  1.13<H>  /  AST  243<H>  /  ALT  162<H>  /  AlkPhos  109      PT/INR - ( 28 Dec 2020 06:29 )   PT: 12.6 sec;   INR: 1.09 ratio         PTT - ( 28 Dec 2020 06:29 )  PTT:31.8 sec  Urinalysis Basic - ( 28 Dec 2020 15:32 )    Color: Yellow / Appearance: Clear / S.010 / pH: x  Gluc: x / Ketone: Negative  / Bili: Negative / Urobili: 4 mg/dL   Blood: x / Protein: 15 mg/dL / Nitrite: Negative   Leuk Esterase: Negative / RBC: x / WBC 0-2   Sq Epi: x / Non Sq Epi: Occasional / Bacteria: Occasional      CAPILLARY BLOOD GLUCOSE          RADIOLOGY & ADDITIONAL TESTS:    Imaging Personally Reviewed:  [ X] YES  [ ] NO    Consultant(s) Notes Reviewed:  [ X] YES  [ ] NO    Care Discussed with Consultants/Other Providers [X ] YES  [ ] NO

## 2020-12-30 ENCOUNTER — TRANSCRIPTION ENCOUNTER (OUTPATIENT)
Age: 40
End: 2020-12-30

## 2020-12-30 VITALS
SYSTOLIC BLOOD PRESSURE: 124 MMHG | RESPIRATION RATE: 20 BRPM | DIASTOLIC BLOOD PRESSURE: 84 MMHG | TEMPERATURE: 98 F | OXYGEN SATURATION: 97 % | HEART RATE: 87 BPM

## 2020-12-30 PROCEDURE — 99239 HOSP IP/OBS DSCHRG MGMT >30: CPT

## 2020-12-30 RX ORDER — INFLUENZA VIRUS VACCINE 15; 15; 15; 15 UG/.5ML; UG/.5ML; UG/.5ML; UG/.5ML
0.5 SUSPENSION INTRAMUSCULAR ONCE
Refills: 0 | Status: DISCONTINUED | OUTPATIENT
Start: 2020-12-30 | End: 2020-12-30

## 2020-12-30 RX ADMIN — DEXTROSE MONOHYDRATE, SODIUM CHLORIDE, AND POTASSIUM CHLORIDE 80 MILLILITER(S): 50; .745; 4.5 INJECTION, SOLUTION INTRAVENOUS at 05:45

## 2020-12-30 RX ADMIN — Medication 50 MILLIGRAM(S): at 05:45

## 2020-12-30 NOTE — DISCHARGE NOTE PROVIDER - HOSPITAL COURSE
40m pmhx H. pylori with progressively worsening abdominal pain. points toward his epigastrum and RLQ. +nausea and vomiting. no blood in vomit. +diarrhea. no blood in diarrhea. no cp or sob or fever. familia also reports occasional dysuria and says it feels like he has chlamydia again. drinks daily and has not had a drink since last night.     (28 Dec 2020 13:02)  In hospital pt admitted to telemetry unit. Placed on ciwa protocol with librium taper for etoh withdrawal. CIWA 3 today, pt for discharge to home with librium for 2 more days.

## 2020-12-30 NOTE — PROGRESS NOTE ADULT - PROBLEM/PLAN-2
CHIEF COMPLAINT  Well Child  Here with Mom. No issues. Brother may have Autism. Starting . Bam Granado is a 10 y.o. female who presents with Mother     ROS  All other review of systems negative, except for those noted. PAST MEDICAL HISTORY    No past medical history on file. MEDICATIONS  No current outpatient prescriptions on file. No current facility-administered medications for this visit. ALLERGIES  No Known Allergies    PHYSICAL EXAM  Vital Signs:    Vitals:    08/09/18 0834   BP: 90/50   Site: Right Arm   Position: Sitting   Cuff Size: Child   Pulse: 80   Weight: 48 lb (21.8 kg)   Height: 43\" (109.2 cm)     Wt Readings from Last 3 Encounters:   08/09/18 48 lb (21.8 kg) (76 %, Z= 0.71)*   11/22/17 44 lb (20 kg) (77 %, Z= 0.74)*   08/21/17 45 lb 6.4 oz (20.6 kg) (87 %, Z= 1.14)*     * Growth percentiles are based on CDC 2-20 Years data. Ht Readings from Last 3 Encounters:   08/09/18 43\" (109.2 cm) (26 %, Z= -0.65)*   11/22/17 41.5\" (105.4 cm) (34 %, Z= -0.42)*   08/21/17 41\" (104.1 cm) (37 %, Z= -0.32)*     * Growth percentiles are based on CDC 2-20 Years data. Body mass index is 18.25 kg/m². 94 %ile (Z= 1.54) based on CDC 2-20 Years BMI-for-age data using vitals from 8/9/2018.  76 %ile (Z= 0.71) based on CDC 2-20 Years weight-for-age data using vitals from 8/9/2018.  26 %ile (Z= -0.65) based on CDC 2-20 Years stature-for-age data using vitals from 8/9/2018. Constitutional:  Healthy. HEENT:  Normocephalic, Atraumatic, EACs and TMS intact and normal. Hearing grossly normal. Nasal mucosa, septum, turbinates normal. Lips, teeth and gums normal. Oropharynx normal. No cervical adenopathy. Neck:  Supple. Thyroid not enlarged and no masses. Cardiovascular:  Regular rate and rhythm. Normal S1 and S2. No murmurs, rubs or gallops. No edema. Respiratory: Normal breath sounds, No respiratory distress, No wheezing, No chest tenderness.    Abdomen: Soft,
DISPLAY PLAN FREE TEXT
DISPLAY PLAN FREE TEXT

## 2020-12-30 NOTE — PROGRESS NOTE ADULT - PROBLEM SELECTOR PLAN 1
ciwa protocol    c/w prn bipap for sleep apnea ciwa protocol, will finish last two librium days at home. c/w prn bipap for sleep apnea.  Discharge home later today.

## 2020-12-30 NOTE — DISCHARGE NOTE PROVIDER - NSDCCPCAREPLAN_GEN_ALL_CORE_FT
PRINCIPAL DISCHARGE DIAGNOSIS  Diagnosis: Alcohol withdrawal  Assessment and Plan of Treatment:       SECONDARY DISCHARGE DIAGNOSES  Diagnosis: Hepatic steatosis  Assessment and Plan of Treatment:

## 2020-12-30 NOTE — DISCHARGE NOTE NURSING/CASE MANAGEMENT/SOCIAL WORK - PATIENT PORTAL LINK FT
You can access the FollowMyHealth Patient Portal offered by [facility] by registering at the following website: [email or web address]. By joining [employer]’s FollowMyHealth portal, you will also be able to view your health information using other applications (apps) compatible with our system.

## 2020-12-30 NOTE — PATIENT PROFILE ADULT - NSPRESCRUSEDDRG_GEN_A_NUR
11 month old patient called into ED for positive blood culture results from 1/16. Pt well appearing tolerating PO, last void 0900, febrile to 102 at 1100, 6th day of fever, mom game motrin at 1100. Vaccination up to date. Recent travel to [place], returned on 1/2/2019.
No

## 2020-12-30 NOTE — PROGRESS NOTE ADULT - SUBJECTIVE AND OBJECTIVE BOX
INTERVAL HPI/OVERNIGHT EVENTS:  Pt seen and examined at bedside.     Allergies/Intolerance: No Known Allergies      MEDICATIONS  (STANDING):  chlordiazePOXIDE   Oral   chlordiazePOXIDE 50 milliGRAM(s) Oral every 12 hours  influenza   Vaccine 0.5 milliLiter(s) IntraMuscular once  pantoprazole  Injectable 40 milliGRAM(s) IV Push daily  sodium chloride 0.9% with potassium chloride 20 mEq/L 1000 milliLiter(s) (80 mL/Hr) IV Continuous <Continuous>    MEDICATIONS  (PRN):  acetaminophen   Tablet .. 650 milliGRAM(s) Oral every 6 hours PRN Temp greater or equal to 38C (100.4F), Mild Pain (1 - 3)        ROS: all systems reviewed and wnl      PHYSICAL EXAMINATION:  Vital Signs Last 24 Hrs  T(C): 36.8 (30 Dec 2020 05:00), Max: 37.3 (29 Dec 2020 23:45)  T(F): 98.2 (30 Dec 2020 05:00), Max: 99.1 (29 Dec 2020 23:45)  HR: 90 (30 Dec 2020 08:42) (65 - 92)  BP: 139/87 (30 Dec 2020 05:00) (127/84 - 145/97)  BP(mean): --  RR: 18 (30 Dec 2020 05:00) (17 - 18)  SpO2: 97% (30 Dec 2020 08:42) (95% - 98%)  CAPILLARY BLOOD GLUCOSE            GENERAL:   NECK: supple, No JVD  CHEST/LUNG: clear to auscultation bilaterally; no rales, rhonchi, or wheezing b/l  HEART: normal S1, S2  ABDOMEN: BS+, soft, ND, NT   EXTREMITIES:  pulses palpable; no clubbing, cyanosis, or edema b/l LEs  SKIN: no rashes or lesions      LABS:                        15.4   5.19  )-----------( 143      ( 29 Dec 2020 05:37 )             45.0     12-    136  |  99  |  10  ----------------------------<  79  3.4<L>   |  29  |  0.70    Ca    9.0      29 Dec 2020 05:37  Phos  3.5     12-  Mg     2.1     -    TPro  7.7  /  Alb  3.6  /  TBili  2.2<H>  /  DBili  1.13<H>  /  AST  243<H>  /  ALT  162<H>  /  AlkPhos  109  12-28      Urinalysis Basic - ( 28 Dec 2020 15:32 )    Color: Yellow / Appearance: Clear / S.010 / pH: x  Gluc: x / Ketone: Negative  / Bili: Negative / Urobili: 4 mg/dL   Blood: x / Protein: 15 mg/dL / Nitrite: Negative   Leuk Esterase: Negative / RBC: x / WBC 0-2   Sq Epi: x / Non Sq Epi: Occasional / Bacteria: Occasional             INTERVAL HPI/OVERNIGHT EVENTS:  Pt seen and examined at bedside.     Allergies/Intolerance: No Known Allergies      MEDICATIONS  (STANDING):  chlordiazePOXIDE   Oral   chlordiazePOXIDE 50 milliGRAM(s) Oral every 12 hours  influenza   Vaccine 0.5 milliLiter(s) IntraMuscular once  pantoprazole  Injectable 40 milliGRAM(s) IV Push daily  sodium chloride 0.9% with potassium chloride 20 mEq/L 1000 milliLiter(s) (80 mL/Hr) IV Continuous <Continuous>    MEDICATIONS  (PRN):  acetaminophen   Tablet .. 650 milliGRAM(s) Oral every 6 hours PRN Temp greater or equal to 38C (100.4F), Mild Pain (1 - 3)        ROS: all systems reviewed and wnl      PHYSICAL EXAMINATION:  Vital Signs Last 24 Hrs  T(C): 36.8 (30 Dec 2020 05:00), Max: 37.3 (29 Dec 2020 23:45)  T(F): 98.2 (30 Dec 2020 05:00), Max: 99.1 (29 Dec 2020 23:45)  HR: 90 (30 Dec 2020 08:42) (65 - 92)  BP: 139/87 (30 Dec 2020 05:00) (127/84 - 145/97)  BP(mean): --  RR: 18 (30 Dec 2020 05:00) (17 - 18)  SpO2: 97% (30 Dec 2020 08:42) (95% - 98%)  CAPILLARY BLOOD GLUCOSE            GENERAL: stable, NAD, eager for discharge, no tremors or withdrawal   NECK: supple, No JVD  CHEST/LUNG: clear to auscultation bilaterally; no rales, rhonchi, or wheezing b/l  HEART: normal S1, S2  ABDOMEN: BS+, soft, ND, NT   EXTREMITIES:  pulses palpable; no clubbing, cyanosis, or edema b/l LEs  SKIN: no rashes or lesions      LABS:                        15.4   5.19  )-----------( 143      ( 29 Dec 2020 05:37 )             45.0         136  |  99  |  10  ----------------------------<  79  3.4<L>   |  29  |  0.70    Ca    9.0      29 Dec 2020 05:37  Phos  3.5     12-  Mg     2.1     12-29    TPro  7.7  /  Alb  3.6  /  TBili  2.2<H>  /  DBili  1.13<H>  /  AST  243<H>  /  ALT  162<H>  /  AlkPhos  109  12-28      Urinalysis Basic - ( 28 Dec 2020 15:32 )    Color: Yellow / Appearance: Clear / S.010 / pH: x  Gluc: x / Ketone: Negative  / Bili: Negative / Urobili: 4 mg/dL   Blood: x / Protein: 15 mg/dL / Nitrite: Negative   Leuk Esterase: Negative / RBC: x / WBC 0-2   Sq Epi: x / Non Sq Epi: Occasional / Bacteria: Occasional

## 2020-12-30 NOTE — PROGRESS NOTE ADULT - ASSESSMENT
40 years old male with alchohol abuse with elevated ciwa with hypokalemia and elevated lfts      
40 years old male with alchohol abuse with elevated ciwa with hypokalemia and elevated lfts

## 2021-01-02 DIAGNOSIS — K76.0 FATTY (CHANGE OF) LIVER, NOT ELSEWHERE CLASSIFIED: ICD-10-CM

## 2021-01-02 DIAGNOSIS — F10.139 ALCOHOL ABUSE WITH WITHDRAWAL, UNSPECIFIED: ICD-10-CM

## 2021-01-02 DIAGNOSIS — E87.6 HYPOKALEMIA: ICD-10-CM

## 2021-01-02 DIAGNOSIS — G47.30 SLEEP APNEA, UNSPECIFIED: ICD-10-CM

## 2021-03-30 NOTE — ED PROVIDER NOTE - CROS ED CARDIOVAS ALL NEG
[facility] PHYSICIANS  [facility] [address]  [address] [id number]  Dept: [phone]  OF VISIT:  3/30/21        History and Physical    Martin Swanson    :  1957  CHIEF COMPLAINT:    Chief Complaint   Patient presents with    Annual Exam     Prev Pap: 3/2/20 Neg/HPV Neg; Prev Shara: 20 WNL; Prev Dexa:  WNL                    HPI :   Martin Swanson is a 58 y.o. female    The patient was seen and examined. Per the patient bowels are regular. She has novoiding complaints. She denies any bloating as well as vaginal discharge. No vaginal dryness or hot flushes. Thinking about retiring in the next few years.    _____________________________________________________________________  Past Medical History:   Diagnosis Date    HTN (hypertension)     ON RX    Hyperlipidemia     ON RX     IBS (irritable bowel syndrome)     Ovarian cyst     reabsorbed    Rotator cuff syndrome of right shoulder     ON GOING HAS HAD SURGERY AND INJECTIONS    Wears glasses                                                                    Past Surgical History:   Procedure Laterality Date     SECTION      CHOLECYSTECTOMY, LAPAROSCOPIC      COLONOSCOPY      negative    FINGER TRIGGER RELEASE Right 2017    middle    MYRINGOTOMY AND TYMPANOSTOMY TUBE PLACEMENT Right 02/10/2017    MYRINGOTOMY AND TYMPANOSTOMY TUBE PLACEMENT Right 2018    MYRINGOTOMY AND TYMPANOSTOMY TUBE PLACEMENT Right 2018    MYRINGOTOMY WITH T- TUBE INSERTION, NASAL ENDOSCOPY WITH DEBRIDEMENT performed by Sarah Fitzgerald MD at [address] Right 2018    OTHER SURGICAL HISTORY  02/10/2017    FESS    [place] INCISE FINGER TENDON SHEATH Right 2017    RIGHT MIDDLE FINGER TRIGGER RELEASE (3080 TABLE) performed by Nini Harrell DO at [address] SCOPE,BX/RMV POLYP/DEBRID N/A 2/10/2017    ENDOSCOPIC SINUS SURGERY WITH STEALTH NAVIGATION, MAXILLARY ANTROSTOMY, ETHMOIDECTOMY, FRONTAL SINUS EXPLORATION, SPHENOIDOTOMY, BILATERAL TURBINATE REDUCTION, RIGHT EAR TUBE INSERTION performed by Ayah Euceda MD at [address] Right     1 SURGERY AND SEVERAL INJECTIONS    TONSILLECTOMY  1966    WISDOM TOOTH EXTRACTION  1988    4 TEETH REMOVED     Family History   Problem Relation Age of Onset    High Blood Pressure Mother     COPD Mother     Cancer Maternal Cousin 46        Colon CA    Cancer Maternal Grandmother         ovarian     High Blood Pressure Maternal Grandmother     Other Brother         CEREBAL PALSY   OF NATURAL CAUSES     Social History     Tobacco Use   Smoking Status Never Smoker   Smokeless Tobacco Never Used     Social History     Substance and Sexual Activity   Alcohol Use Yes    Frequency: Monthly or less    Drinks per session: 1 or 2    Comment: BEER WINE OR MIXED DRINKS 1 OR 2 A WEEK     Current Outpatient Medications   Medication Sig Dispense Refill    calcium carbonate 600 MG TABS tablet Take 1 tablet by mouth daily      VITAMIN D PO Take 1 tablet by mouth daily      cyclobenzaprine (FLEXERIL) 10 MG tablet Take 10 mg by mouth as needed.  hydrochlorothiazide (HYDRODIURIL) 25 MG tablet Take 25 mg by mouth daily.  ibuprofen (ADVIL;MOTRIN) 800 MG tablet Take 800 mg by mouth every 6 hours as needed LAST DOSE 2018      loratadine (CLARITIN) 10 MG tablet Take 10 mg by mouth as needed Last dose 2018       No current facility-administered medications for this visit. Facility-Administered Medications Ordered in Other Visits   Medication Dose Route Frequency Provider Last Rate Last Admin    Bupivacaine HCl (PF) (MARCAINE) 0.75 % injection 225 mg  30 mL Intradermal Once Laquetta Infield, DO           Allergies:  Seasonal    Gynecologic History:  Patient's last menstrual period was 2008 (within days).   Sexually Active: No  STD History: No  Pap smear history: negative      OB History    Para Term  AB Living   3 3 0 0 0 3   SAB TAB Ectopic Molar Multiple Live Births   0 0 0 0 0 3     ______________________________________________________________________  REVIEW OF SYSTEMS:  Review of Systems   Constitutional: Negative for chills and fever. HENT: Negative for hearing loss. Respiratory: Negative for cough and wheezing. Cardiovascular: Negative for chest pain and palpitations. Gastrointestinal: Negative for abdominal pain, constipation, diarrhea, nausea and vomiting. Genitourinary: Negative for dysuria, frequency and urgency. Musculoskeletal: Negative for myalgias. Skin: Negative for rash. Neurological: Negative for dizziness, weakness and headaches. Hematological: Does not bruise/bleed easily. Psychiatric/Behavioral: Negative for suicidal ideas. /84 (Position: Sitting, Cuff Size: Medium Adult)   Ht 5' (1.524 m)   Wt 169 lb (76.7 kg)   LMP 2008 (Within Days)   BMI 33.01 kg/m²                    Physical Exam:     Physical Exam  Constitutional:       Appearance: She is well-developed. HENT:      Head: Normocephalic. Neck:      Musculoskeletal: Normal range of motion. Thyroid: No thyromegaly. Vascular: No JVD. Cardiovascular:      Rate and Rhythm: Normal rate and regular rhythm. Pulmonary:      Effort: Pulmonary effort is normal. No respiratory distress. Breath sounds: Normal breath sounds. No wheezing or rales. Chest:      Chest wall: No tenderness. Breasts: Breasts are symmetrical.         Right: No inverted nipple, mass, nipple discharge, skin change or tenderness. Left: No inverted nipple, mass, nipple discharge, skin change or tenderness. Abdominal:      General: Bowel sounds are normal. There is no distension. Palpations: Abdomen is soft. Tenderness: There is no abdominal tenderness. Genitourinary:     Exam position: Supine.       Labia: Right: No rash, tenderness, lesion or injury. Left: No rash, tenderness, lesion or injury. Vagina: No signs of injury and foreign body. No vaginal discharge, erythema, tenderness or bleeding. Cervix: No cervical motion tenderness, discharge or friability. Uterus: Not deviated, not enlarged, not fixed and not tender. Adnexa:         Right: No mass, tenderness or fullness. Left: No mass, tenderness or fullness. Musculoskeletal: Normal range of motion. Lymphadenopathy:      Cervical: No cervical adenopathy. Skin:     General: Skin is warm and dry. Neurological:      Mental Status: She is alert and oriented to person, place, and time. Deep Tendon Reflexes: Reflexes are normal and symmetric. Psychiatric:         Behavior: Behavior normal.         Thought Content: Thought content normal.         Judgment: Judgment normal.             ASSESSMENT:        58 y.o. Female; Annual   Diagnosis Orders   1. Encounter for gynecological examination  PAP SMEAR   2. Encounter for screening mammogram for breast cancer  ALISA DIGITAL SCREEN W OR WO CAD BILATERAL     Return in about 1 year (around 3/30/2022) for annual exam.              Hereditary Breast, Ovarian,Colon and Uterine Cancer screening Done. Tobacco & Secondary smoke risks reviewed; instructed oncessation and avoidance    PLAN:  - Pap collected, pt prefers yearly. -Menopause symptoms discussed   - Screening mammogram discussed and advised yearly if normal starting at age 36.  - Routine health maintenance per patients PCP.     Electronically signed by Aimee Roe MD on 3/30/2021at 4:13 PM  Terry negative...

## 2021-04-25 PROBLEM — U07.1 COVID-19: Chronic | Status: ACTIVE | Noted: 2020-12-28

## 2021-05-05 ENCOUNTER — APPOINTMENT (OUTPATIENT)
Dept: DISASTER EMERGENCY | Facility: OTHER | Age: 41
End: 2021-05-05

## 2021-05-23 ENCOUNTER — EMERGENCY (EMERGENCY)
Facility: HOSPITAL | Age: 41
LOS: 0 days | Discharge: HOME | End: 2021-05-23
Attending: EMERGENCY MEDICINE
Payer: OTHER MISCELLANEOUS

## 2021-05-23 VITALS
WEIGHT: 197.98 LBS | HEIGHT: 65 IN | TEMPERATURE: 98 F | OXYGEN SATURATION: 97 % | SYSTOLIC BLOOD PRESSURE: 142 MMHG | HEART RATE: 92 BPM | DIASTOLIC BLOOD PRESSURE: 95 MMHG | RESPIRATION RATE: 18 BRPM

## 2021-05-23 DIAGNOSIS — M79.602 PAIN IN LEFT ARM: ICD-10-CM
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
## 2021-05-23 DIAGNOSIS — S62.600A FRACTURE OF UNSPECIFIED PHALANX OF RIGHT INDEX FINGER, INITIAL ENCOUNTER FOR CLOSED FRACTURE: ICD-10-CM

## 2021-05-23 DIAGNOSIS — Z86.16 PERSONAL HISTORY OF COVID-19: ICD-10-CM

## 2021-05-23 PROCEDURE — 73090 X-RAY EXAM OF FOREARM: CPT | Mod: 26,LT,76

## 2021-05-23 PROCEDURE — 73080 X-RAY EXAM OF ELBOW: CPT | Mod: 26,LT

## 2021-05-23 PROCEDURE — 73110 X-RAY EXAM OF WRIST: CPT | Mod: 26,LT

## 2021-05-23 PROCEDURE — 73562 X-RAY EXAM OF KNEE 3: CPT | Mod: 26,RT

## 2021-05-23 PROCEDURE — 99284 EMERGENCY DEPT VISIT MOD MDM: CPT

## 2021-05-23 PROCEDURE — 73140 X-RAY EXAM OF FINGER(S): CPT | Mod: 26,RT

## 2021-05-23 RX ORDER — OXYCODONE AND ACETAMINOPHEN 5; 325 MG/1; MG/1
1 TABLET ORAL ONCE
Refills: 0 | Status: DISCONTINUED | OUTPATIENT
Start: 2021-05-23 | End: 2021-05-23

## 2021-05-23 RX ORDER — KETOROLAC TROMETHAMINE 30 MG/ML
60 SYRINGE (ML) INJECTION ONCE
Refills: 0 | Status: DISCONTINUED | OUTPATIENT
Start: 2021-05-23 | End: 2021-05-23

## 2021-05-23 RX ORDER — IBUPROFEN 200 MG
1 TABLET ORAL
Qty: 20 | Refills: 0
Start: 2021-05-23 | End: 2021-05-27

## 2021-05-23 RX ADMIN — OXYCODONE AND ACETAMINOPHEN 1 TABLET(S): 5; 325 TABLET ORAL at 09:41

## 2021-05-23 RX ADMIN — OXYCODONE AND ACETAMINOPHEN 1 TABLET(S): 5; 325 TABLET ORAL at 11:01

## 2021-05-23 RX ADMIN — Medication 60 MILLIGRAM(S): at 09:41

## 2021-05-23 NOTE — ED ADULT NURSE NOTE - PATIENT/CAREGIVER OFFERED  INTERPRETER SERVICES
[facility] [place], [place]                                        CONSULTATION      NAME:            NICOLE BATRES                          AGE:  25   ACCT#:           [id number]                              :  1992   MR#:             [id number]                             ROOM:  2131   ADMIT DATE:      2017                        DIS DATE:     PT TYPE:         I                                       DP:  1726   ATTENDING:       SAULO DAVIS MD                                  DICTATING PHYSICIAN:  CHEY LANDERS MD      DATE OF CONSULTATION:               CARDIOLOGY CONSULTATION      DOCTOR REQUESTING CONSULTATION:  Saulo Davis MD      REASON FOR CONSULTATION:  Shortness of breath, wheezing, and history of cardiac   surgery at age 1.      HISTORY OF PRESENT ILLNESS:  This is a 25-year-old lady, who had history of   either an ASD or VSD repair (a hole in the heart was repaired at age 1 due to   cardiac murmur).  She had no cardiac issues after that and no clinical problems.   No limitation of her activities since during her life.  She came here to the   hospital after she had labor and eventually she went into .  She was in   labor for a day or 2.  She received some IV fluids.  She did have some decreased   urine output in the pre-surgery and after surgery.  She was evaluated by   Nephrology and she was started on albumin.  She started to make some urine, but   she developed some wheezing over night and some orthopnea.  Cardiology was asked   to see her and to do an echocardiogram as well.      PAST MEDICAL HISTORY:   1. Includes heart surgery at a younger age because of a hole in her heart,       which was closed.   2. Gestational diabetes.      ALLERGIES:  NOT KNOWN TO HAVE ANY DRUG ALLERGY.      REVIEW OF SYSTEMS:  CONSTITUTIONAL:  Denies fever or chills.  NECK:  No pain.   LUNGS:  She had  shortness of breath and wheezing.  CARDIAC:  Denied any chest   pain.  GI:  She had no nausea or vomiting.      PHYSICAL EXAMINATION:   VITAL SIGNS:  Temperature was 37, blood pressure is 133/88, and respiratory rate   of 22.   GENERAL:  The patient looked slightly pale.  She is puffy.   NECK: Supple.   HEART:  S1, S2, and S3.  No murmur appreciated.   ABDOMEN:  Soft.  She did have a lower thoracic incision from prior cardiac   surgery at the younger age.   EXTREMITY:  There are 2 to 3+ edema.   LUNGS:  Diminished at the bases with some inspiratory and expiratory wheezing.   Fine rales.      LABORATORY DATA:  Sodium was 141, potassium was 3.6, BUN was 10, and creatinine   0.5.  Earlier BUN was 9 and creatinine 0.73.  Her albumin was low at 1.5 and   total protein was low at 5.5.  Alkaline phosphatase was high at 305.      Initial WBC count was 8.3 and now it is 12.0.  Initial hemoglobin was 10.6 and   now it is 9.0.      IMAGING:  Chest x-ray, cardiomegaly and pulmonary vascular congestion with mild   fluid overload and patchy basilar atelectasis.      IMPRESSION:   1. Volume overload and mild degree of acute congestive heart failure.   2. Significant hypoalbuminemia, which is a contributing factor also to her       edema.   3. Significant bilateral lower extremity edema.   4. Recent  after a day of labor with fluid and volume overload.   5. Abnormal chest x-ray and consistent cardiomegaly and congestion.      PLAN:   1. We will keep the patient on cardiac monitor.   2. EKG to be done.   3. Echocardiogram did show ejection fraction of around 50%, mild right atrial       enlargement, mild pulmonary hypertension, mild tricuspid regurgitation, and       the pulmonary pressure was around 39 mmHg.  I could not see any patch at       the interventricular septum or interatrial septum.  We will do also bubble       study this time to see if there is any intracardiac shunt.   4. Strict intake and output, and  discontinue IV fluid and she is getting       diuretics in the form of Lasix IV push 20 mg q.12 hours.   5. Check BNP and monitor her electrolytes closely.   6. Discussed with the patient and with her family.   7. DVT prophylaxis by primary service.   8. Discuss with nursing staff and send message to OB and Nephrology.            ______________________________   MD KARL Burgess/MedQ   DP:  1726   DD:  06/29/2017 10:19:27   DT:  06/29/2017 10:48:39   Job #:  [id number]         yes

## 2021-05-23 NOTE — ED PROVIDER NOTE - PHYSICAL EXAMINATION
Gen: Alert, Well appearing. NAD    Head: NC, AT, PERRL, normal lids/conjunctiva   ENT: Bilateral TM WNL, patent oropharynx without erythema/exudate, uvula midline  Neck: supple, no tenderness/meningismus  Pulm: Bilateral clear BS, normal resp effort  CV: RRR, no M/R/G, +dist pulses   Abd: soft, NT/ND, +BS, no guarding/rebound tenderness  Mskel: + left mid/distal forearm swelling and pain, some ulnar crepitus, mild ecchymosis. able to flex/extend wrist. + radial pulse. CR < sec. Sensation intact. from of elbow. mild ecchymosis and tenderness to distal pulm 2nd finger on RT. strong radial pulse. CR < 2sec. + DIP, PIP and MPJ. + mild rt medial knee tenderness, pain on valgus stress. no swelling/erythema/ecchymosis. FROM. + distal pulses.  Skin: no rash, no bruising  Neuro: AAOx3, no sensory/motor deficits, CN 2-12 intact

## 2021-05-23 NOTE — ED PROVIDER NOTE - CLINICAL SUMMARY MEDICAL DECISION MAKING FREE TEXT BOX
Pt comfortable, ortho consult for ulnar comminuted fx, also small concern for developing possible compartment syndrome given significant force from horse. Pt comfortable, ortho consult for ulnar comminuted fx, also small concern for developing possible compartment syndrome given significant force from horse.    pt seen by ortho, placed in splint and fu with ortho in 1 week. They feel there is no concern for developing compartment syndrome, however  used to give pt strict instructions for return.

## 2021-05-23 NOTE — CONSULT NOTE ADULT - SUBJECTIVE AND OBJECTIVE BOX
40y Male presents s/p being kicked in L forearm by horse. Reports he works at [employer] was kicked in his L forearm. No fall. Denies numbness/tingling in the affected extremity. Denies head strike/LOC/other orthopedic injuries at this time.  Patient ambulates without assistance /at baseline.    PAST MEDICAL & SURGICAL HISTORY:  No pertinent past medical history    COVID-19  april and june    No significant past surgical history      Home Medications:    Allergies    No Known Allergies    Intolerances                        Vital Signs Last 24 Hrs  T(C): 36.8 (23 May 2021 08:42), Max: 36.8 (23 May 2021 08:42)  T(F): 98.3 (23 May 2021 08:42), Max: 98.3 (23 May 2021 08:42)  HR: 92 (23 May 2021 08:42) (92 - 92)  BP: 142/95 (23 May 2021 08:42) (142/95 - 142/95)  BP(mean): --  RR: 18 (23 May 2021 08:42) (18 - 18)  SpO2: 97% (23 May 2021 08:42) (97% - 97%)    PHYSICAL EXAM  General: NAD, Awake and Alert    LUE:  skin intact, minimal to no swelling  TTP over mid/distal 3rd ulna   +sensation C5-T1  +nerves anterior interosseus/posterior interosseus/radial/median/ulnar/musculocutaneous  +radial pulse  Soft compartments, - calf tenderness      RLE:  NTTP about hip knee ankle  Full active painless ROM of hip knee ankle  +sensation L2-S1  +dorsiflexion/plantarflexion of ankle/hallux  +dorsalis pedis pulse  Soft compartments, - calf tenderness        Secondary Exam: Benign, Skin intact, NTTP along axial spine, SILT throughout, motor grossly intact throughout, no other orthopedic injuries at this time, compartments soft and compressible        IMAGING:  XR : L Mid /distal 3rd ulna shaft fx, minimall displaced with comminution   XR R Knee negative for Fx Dx      Assessment/Plan:  40y Male with Left mid shaft / distal 3rd ulna fracture with minimal displacement and comminution     -Placed in a sugartong splint with interosseous mold  -NVI before and after splint  -No concern for compartment syndrome at this time  -NWB LUE in splint and sling  -Ice elevate   -FU w/ Dr. Briscoe in1 week  -Pain control as needed  -No acute orthopedic surgical intervention needed at this time  -Discussed with attending who agrees with plan  -Ortho stable for discharge

## 2021-05-23 NOTE — ED ADULT TRIAGE NOTE - CHIEF COMPLAINT QUOTE
pt from [facility]. pt kicked by horse in left arm. pt c/o left arm and right index finger pain. left arm numbness. denies loc. Frisian speaking

## 2021-05-23 NOTE — ED ADULT NURSE NOTE - NS_ED_NURSE_TEACHING_TOPIC_ED_A_ED
pt educated on side effects of pain medication prescribed. Communicated to in Lithuanian. Pt verbalized understanding./Orthopedic/Other specify

## 2021-05-23 NOTE — ED PROVIDER NOTE - OBJECTIVE STATEMENT
40 year old male with no significant PMH who presents to the ED today s/p working at the [employer] when a horse kicked him in the left forearm. Pt c/o significant pain there with mild pain from secondary distal finger from trying to block the hit. Pt also c/o medial right knee pain after twisting his knee. Denies falls, head strike LOC, trauma, chest, rib, or abdominal wall pain    No fever/chills, No photophobia/eye pain/changes in vision, No ear pain/sore throat/dysphagia, No chest pain/palpitations, no SOB/cough/wheeze/stridor, No abdominal pain, No N/V/D, no dysuria/frequency/discharge, No neck/back pain, no rash, no changes in neurological status/function. + left forearm pain, + right knee pain

## 2021-05-23 NOTE — ED PROVIDER NOTE - PATIENT PORTAL LINK FT
You can access the FollowMyHealth Patient Portal offered by [facility] by registering at the following website: [email or web address]. By joining [employer]’s FollowMyHealth portal, you will also be able to view your health information using other applications (apps) compatible with our system.

## 2021-05-23 NOTE — ED PROVIDER NOTE - CARE PROVIDER_API CALL
Cricket Briscoe)  Orthopaedic Surgery  [address], [address]  [place] [address]  Phone: [phone]  Fax: [phone]  Follow Up Time:

## 2021-05-23 NOTE — ED ADULT NURSE NOTE - OBJECTIVE STATEMENT
primary language Swedish, communicated to through  Pedro, [id number]. 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 pt reports pain to left arm, especially with movement. pain medication administered. pt notified of side effects of medicine. communicated understanding  no s/s of distress  pulses palpable.   awaiting further MD turner and mylene   denies pmhx

## 2021-05-23 NOTE — ED ADULT NURSE NOTE - CHIEF COMPLAINT QUOTE
pt from [facility]. pt kicked by horse in left arm. pt c/o left arm and right index finger pain. left arm numbness. denies loc. Slovenian speaking

## 2021-05-23 NOTE — ED PROVIDER NOTE - CARE PLAN
Principal Discharge DX:	Closed displaced comminuted fracture of shaft of ulna, unspecified laterality, initial encounter  Secondary Diagnosis:	Knee sprain  Secondary Diagnosis:	Contusion of right index finger without damage to nail, initial encounter

## 2021-05-23 NOTE — ED PROVIDER NOTE - NSFOLLOWUPINSTRUCTIONS_ED_ALL_ED_FT
Regressa al hospital immediamente si tiene mas dolor in el brazo, paresthesia, no puede  or sentir la man0, o mas hinchado    Leonora ibuprofen para dolor y percocet si dolar intensifica    haz claude roslyn con el especialista de los huesos en claude semana

## 2021-09-08 NOTE — ED ADULT NURSE NOTE - CHPI ED NUR QUALITY2
Orientation to room/Bed in low position, brakes on/Call light is within reach, educate patient/family on its functionality/Patient and family education available to parents and patient
sharp

## 2021-09-09 ENCOUNTER — INPATIENT (INPATIENT)
Facility: HOSPITAL | Age: 41
LOS: 0 days | Discharge: HOME | End: 2021-09-10
Attending: SURGERY | Admitting: SURGERY
Payer: MEDICAID

## 2021-09-09 VITALS
SYSTOLIC BLOOD PRESSURE: 151 MMHG | HEART RATE: 90 BPM | OXYGEN SATURATION: 95 % | HEIGHT: 67 IN | RESPIRATION RATE: 17 BRPM | TEMPERATURE: 98 F | DIASTOLIC BLOOD PRESSURE: 105 MMHG | WEIGHT: 220.02 LBS

## 2021-09-09 DIAGNOSIS — K81.9 CHOLECYSTITIS, UNSPECIFIED: ICD-10-CM

## 2021-09-09 DIAGNOSIS — Z29.9 ENCOUNTER FOR PROPHYLACTIC MEASURES, UNSPECIFIED: ICD-10-CM

## 2021-09-09 DIAGNOSIS — F10.10 ALCOHOL ABUSE, UNCOMPLICATED: ICD-10-CM

## 2021-09-09 LAB
ALBUMIN SERPL ELPH-MCNC: 3.5 G/DL — SIGNIFICANT CHANGE UP (ref 3.3–5)
ALP SERPL-CCNC: 146 U/L — HIGH (ref 40–120)
ALT FLD-CCNC: 86 U/L — HIGH (ref 12–78)
ANION GAP SERPL CALC-SCNC: 9 MMOL/L — SIGNIFICANT CHANGE UP (ref 5–17)
APTT BLD: 30.7 SEC — SIGNIFICANT CHANGE UP (ref 27.5–35.5)
AST SERPL-CCNC: 157 U/L — HIGH (ref 15–37)
BASOPHILS # BLD AUTO: 0.1 K/UL — SIGNIFICANT CHANGE UP (ref 0–0.2)
BASOPHILS NFR BLD AUTO: 0.9 % — SIGNIFICANT CHANGE UP (ref 0–2)
BILIRUB SERPL-MCNC: 2 MG/DL — HIGH (ref 0.2–1.2)
BUN SERPL-MCNC: 18 MG/DL — SIGNIFICANT CHANGE UP (ref 7–23)
CALCIUM SERPL-MCNC: 9.1 MG/DL — SIGNIFICANT CHANGE UP (ref 8.5–10.1)
CHLORIDE SERPL-SCNC: 101 MMOL/L — SIGNIFICANT CHANGE UP (ref 96–108)
CO2 SERPL-SCNC: 26 MMOL/L — SIGNIFICANT CHANGE UP (ref 22–31)
CREAT SERPL-MCNC: 0.77 MG/DL — SIGNIFICANT CHANGE UP (ref 0.5–1.3)
EOSINOPHIL # BLD AUTO: 0.05 K/UL — SIGNIFICANT CHANGE UP (ref 0–0.5)
EOSINOPHIL NFR BLD AUTO: 0.4 % — SIGNIFICANT CHANGE UP (ref 0–6)
GLUCOSE SERPL-MCNC: 102 MG/DL — HIGH (ref 70–99)
HCT VFR BLD CALC: 45.2 % — SIGNIFICANT CHANGE UP (ref 39–50)
HGB BLD-MCNC: 16 G/DL — SIGNIFICANT CHANGE UP (ref 13–17)
IMM GRANULOCYTES NFR BLD AUTO: 0.3 % — SIGNIFICANT CHANGE UP (ref 0–1.5)
INR BLD: 1.21 RATIO — HIGH (ref 0.88–1.16)
LACTATE SERPL-SCNC: 0.8 MMOL/L — SIGNIFICANT CHANGE UP (ref 0.7–2)
LIDOCAIN IGE QN: 284 U/L — SIGNIFICANT CHANGE UP (ref 73–393)
LYMPHOCYTES # BLD AUTO: 17.9 % — SIGNIFICANT CHANGE UP (ref 13–44)
LYMPHOCYTES # BLD AUTO: 2.09 K/UL — SIGNIFICANT CHANGE UP (ref 1–3.3)
MCHC RBC-ENTMCNC: 31.7 PG — SIGNIFICANT CHANGE UP (ref 27–34)
MCHC RBC-ENTMCNC: 35.4 GM/DL — SIGNIFICANT CHANGE UP (ref 32–36)
MCV RBC AUTO: 89.7 FL — SIGNIFICANT CHANGE UP (ref 80–100)
MONOCYTES # BLD AUTO: 0.75 K/UL — SIGNIFICANT CHANGE UP (ref 0–0.9)
MONOCYTES NFR BLD AUTO: 6.4 % — SIGNIFICANT CHANGE UP (ref 2–14)
NEUTROPHILS # BLD AUTO: 8.66 K/UL — HIGH (ref 1.8–7.4)
NEUTROPHILS NFR BLD AUTO: 74.1 % — SIGNIFICANT CHANGE UP (ref 43–77)
NRBC # BLD: 0 /100 WBCS — SIGNIFICANT CHANGE UP (ref 0–0)
PLATELET # BLD AUTO: 285 K/UL — SIGNIFICANT CHANGE UP (ref 150–400)
POTASSIUM SERPL-MCNC: 3.9 MMOL/L — SIGNIFICANT CHANGE UP (ref 3.5–5.3)
POTASSIUM SERPL-SCNC: 3.9 MMOL/L — SIGNIFICANT CHANGE UP (ref 3.5–5.3)
PROT SERPL-MCNC: 8.3 GM/DL — SIGNIFICANT CHANGE UP (ref 6–8.3)
PROTHROM AB SERPL-ACNC: 13.9 SEC — HIGH (ref 10.6–13.6)
RAPID RVP RESULT: SIGNIFICANT CHANGE UP
RBC # BLD: 5.04 M/UL — SIGNIFICANT CHANGE UP (ref 4.2–5.8)
RBC # FLD: 11.9 % — SIGNIFICANT CHANGE UP (ref 10.3–14.5)
SARS-COV-2 RNA SPEC QL NAA+PROBE: SIGNIFICANT CHANGE UP
SODIUM SERPL-SCNC: 136 MMOL/L — SIGNIFICANT CHANGE UP (ref 135–145)
WBC # BLD: 11.68 K/UL — HIGH (ref 3.8–10.5)
WBC # FLD AUTO: 11.68 K/UL — HIGH (ref 3.8–10.5)

## 2021-09-09 PROCEDURE — 99285 EMERGENCY DEPT VISIT HI MDM: CPT

## 2021-09-09 PROCEDURE — 74177 CT ABD & PELVIS W/CONTRAST: CPT | Mod: 26,MA

## 2021-09-09 PROCEDURE — 93010 ELECTROCARDIOGRAM REPORT: CPT

## 2021-09-09 PROCEDURE — 76700 US EXAM ABDOM COMPLETE: CPT | Mod: 26

## 2021-09-09 PROCEDURE — 99222 1ST HOSP IP/OBS MODERATE 55: CPT

## 2021-09-09 PROCEDURE — 71045 X-RAY EXAM CHEST 1 VIEW: CPT | Mod: 26

## 2021-09-09 PROCEDURE — 99223 1ST HOSP IP/OBS HIGH 75: CPT

## 2021-09-09 RX ORDER — MORPHINE SULFATE 50 MG/1
2 CAPSULE, EXTENDED RELEASE ORAL EVERY 4 HOURS
Refills: 0 | Status: DISCONTINUED | OUTPATIENT
Start: 2021-09-09 | End: 2021-09-10

## 2021-09-09 RX ORDER — ACETAMINOPHEN 500 MG
650 TABLET ORAL EVERY 6 HOURS
Refills: 0 | Status: DISCONTINUED | OUTPATIENT
Start: 2021-09-09 | End: 2021-09-10

## 2021-09-09 RX ORDER — PIPERACILLIN AND TAZOBACTAM 4; .5 G/20ML; G/20ML
3.38 INJECTION, POWDER, LYOPHILIZED, FOR SOLUTION INTRAVENOUS EVERY 8 HOURS
Refills: 0 | Status: DISCONTINUED | OUTPATIENT
Start: 2021-09-09 | End: 2021-09-10

## 2021-09-09 RX ORDER — FAMOTIDINE 10 MG/ML
20 INJECTION INTRAVENOUS ONCE
Refills: 0 | Status: COMPLETED | OUTPATIENT
Start: 2021-09-09 | End: 2021-09-09

## 2021-09-09 RX ORDER — ACETAMINOPHEN 500 MG
1000 TABLET ORAL ONCE
Refills: 0 | Status: DISCONTINUED | OUTPATIENT
Start: 2021-09-09 | End: 2021-09-10

## 2021-09-09 RX ORDER — SODIUM CHLORIDE 9 MG/ML
1000 INJECTION, SOLUTION INTRAVENOUS ONCE
Refills: 0 | Status: COMPLETED | OUTPATIENT
Start: 2021-09-09 | End: 2021-09-09

## 2021-09-09 RX ORDER — ENOXAPARIN SODIUM 100 MG/ML
40 INJECTION SUBCUTANEOUS DAILY
Refills: 0 | Status: DISCONTINUED | OUTPATIENT
Start: 2021-09-09 | End: 2021-09-10

## 2021-09-09 RX ORDER — OXYCODONE HYDROCHLORIDE 5 MG/1
5 TABLET ORAL EVERY 6 HOURS
Refills: 0 | Status: DISCONTINUED | OUTPATIENT
Start: 2021-09-09 | End: 2021-09-10

## 2021-09-09 RX ORDER — SODIUM CHLORIDE 9 MG/ML
1000 INJECTION INTRAMUSCULAR; INTRAVENOUS; SUBCUTANEOUS ONCE
Refills: 0 | Status: COMPLETED | OUTPATIENT
Start: 2021-09-09 | End: 2021-09-09

## 2021-09-09 RX ORDER — IOHEXOL 300 MG/ML
30 INJECTION, SOLUTION INTRAVENOUS ONCE
Refills: 0 | Status: COMPLETED | OUTPATIENT
Start: 2021-09-09 | End: 2021-09-09

## 2021-09-09 RX ORDER — SODIUM CHLORIDE 9 MG/ML
1000 INJECTION, SOLUTION INTRAVENOUS
Refills: 0 | Status: DISCONTINUED | OUTPATIENT
Start: 2021-09-09 | End: 2021-09-10

## 2021-09-09 RX ORDER — METOCLOPRAMIDE HCL 10 MG
10 TABLET ORAL ONCE
Refills: 0 | Status: COMPLETED | OUTPATIENT
Start: 2021-09-09 | End: 2021-09-09

## 2021-09-09 RX ORDER — ACETAMINOPHEN 500 MG
975 TABLET ORAL ONCE
Refills: 0 | Status: COMPLETED | OUTPATIENT
Start: 2021-09-09 | End: 2021-09-09

## 2021-09-09 RX ORDER — ONDANSETRON 8 MG/1
4 TABLET, FILM COATED ORAL EVERY 6 HOURS
Refills: 0 | Status: DISCONTINUED | OUTPATIENT
Start: 2021-09-09 | End: 2021-09-10

## 2021-09-09 RX ORDER — MORPHINE SULFATE 50 MG/1
4 CAPSULE, EXTENDED RELEASE ORAL ONCE
Refills: 0 | Status: DISCONTINUED | OUTPATIENT
Start: 2021-09-09 | End: 2021-09-09

## 2021-09-09 RX ORDER — PIPERACILLIN AND TAZOBACTAM 4; .5 G/20ML; G/20ML
3.38 INJECTION, POWDER, LYOPHILIZED, FOR SOLUTION INTRAVENOUS ONCE
Refills: 0 | Status: COMPLETED | OUTPATIENT
Start: 2021-09-09 | End: 2021-09-09

## 2021-09-09 RX ADMIN — Medication 10 MILLIGRAM(S): at 19:50

## 2021-09-09 RX ADMIN — SODIUM CHLORIDE 1000 MILLILITER(S): 9 INJECTION INTRAMUSCULAR; INTRAVENOUS; SUBCUTANEOUS at 19:50

## 2021-09-09 RX ADMIN — Medication 975 MILLIGRAM(S): at 20:20

## 2021-09-09 RX ADMIN — SODIUM CHLORIDE 1000 MILLILITER(S): 9 INJECTION, SOLUTION INTRAVENOUS at 23:56

## 2021-09-09 RX ADMIN — SODIUM CHLORIDE 1000 MILLILITER(S): 9 INJECTION INTRAMUSCULAR; INTRAVENOUS; SUBCUTANEOUS at 21:00

## 2021-09-09 RX ADMIN — FAMOTIDINE 20 MILLIGRAM(S): 10 INJECTION INTRAVENOUS at 19:50

## 2021-09-09 RX ADMIN — PIPERACILLIN AND TAZOBACTAM 200 GRAM(S): 4; .5 INJECTION, POWDER, LYOPHILIZED, FOR SOLUTION INTRAVENOUS at 23:17

## 2021-09-09 RX ADMIN — MORPHINE SULFATE 4 MILLIGRAM(S): 50 CAPSULE, EXTENDED RELEASE ORAL at 19:50

## 2021-09-09 RX ADMIN — IOHEXOL 30 MILLILITER(S): 300 INJECTION, SOLUTION INTRAVENOUS at 19:59

## 2021-09-09 RX ADMIN — SODIUM CHLORIDE 1000 MILLILITER(S): 9 INJECTION, SOLUTION INTRAVENOUS at 22:29

## 2021-09-09 RX ADMIN — MORPHINE SULFATE 4 MILLIGRAM(S): 50 CAPSULE, EXTENDED RELEASE ORAL at 20:20

## 2021-09-09 RX ADMIN — Medication 975 MILLIGRAM(S): at 19:51

## 2021-09-09 NOTE — ED PROVIDER NOTE - PROGRESS NOTE DETAILS
maame on imaging and labs, pt. more comfortable after meds  evaluated by surgery at bedside, agree with admit, plan for OR in AM

## 2021-09-09 NOTE — H&P ADULT - NSHPLABSRESULTS_GEN_ALL_CORE
16.0   11.68 )-----------( 285      ( 09 Sep 2021 19:25 )             45.2   09-09    136  |  101  |  18  ----------------------------<  102<H>  3.9   |  26  |  0.77    Ca    9.1      09 Sep 2021 19:25    TPro  8.3  /  Alb  3.5  /  TBili  2.0<H>  /  DBili  x   /  AST  157<H>  /  ALT  86<H>  /  AlkPhos  146<H>  09-09      < from: US Abdomen Complete (US Abdomen Complete .) (09.09.21 @ 20:55) >    FINDINGS:  Limited study due to extensive shadowing bowel gas.    Liver: Mildly enlarged and of diffuse increased echogenicity suggesting hepatomegaly and hepatic steatosis.  Bile ducts: Normal caliber. Common bile duct measures 4 mm.  Gallbladder: Sludge. No wall thickening or pericholecystic fluid. The sonographic Jenkins sign is reported as positive.  Pancreas: Visualized portions are within normal limits.  Spleen: 9.7. Within normal limits.  Right kidney: 11.0 cm. No hydronephrosis.  Leftkidney: 9.7 cm.  No hydronephrosis.  Ascites: None.  Aorta and IVC: Visualized portions are within normal limits.    IMPRESSION:  Limited study due to extensive shadowing bowel gas.  Gallbladder sludge and positive sonographic Jenkins sign. No wall thickening or pericholecystic fluid.  Hepatomegaly and hepatic steatosis.    < end of copied text >    CT: pending

## 2021-09-09 NOTE — H&P ADULT - NSHPPHYSICALEXAM_GEN_ALL_CORE
PHYSICAL EXAM:  GENERAL: NAD, well-developed  HEAD:  Atraumatic, Normocephalic  EYES: Conjunctiva and sclera clear  ENMT: No tonsillar erythema, exudates, or enlargement; Moist mucous membranes, No lesions  NECK: Supple, No JVD, Normal thyroid  NERVOUS SYSTEM:  Alert & Oriented X3  CHEST/LUNG: Clear to auscultation bilaterally; No rales, rhonchi, wheezing  HEART: Regular rate and rhythm. S1S2  ABDOMEN: Nondistended, +BS, soft, mild RUQ tenderness, no guarding, no rigidity   EXTREMITIES:  2+ Peripheral Pulses, No clubbing, cyanosis, or edema

## 2021-09-09 NOTE — ED PROVIDER NOTE - PHYSICAL EXAMINATION
Vitals: HTN at 151/105, otherwise WNL  Gen: AAOx3, NAD, sitting uncomfortably in stretcher, vomiting basin at bedside   Head: ncat, perrla, eomi b/l  Neck: supple, no lymphadenopathy, no midline deviation  Heart: rrr, no m/r/g  Lungs: CTA b/l, no rales/ronchi/wheezes  Abd: soft, tender in ruq, non-distended, no rebound  Ext: no clubbing/cyanosis/edema  Neuro: sensation and muscle strength intact b/l, steady gait

## 2021-09-09 NOTE — H&P ADULT - PROBLEM SELECTOR PLAN 1
-Admit patient  -OR booked for tomorrow for laparoscopic cholecystectomy with IOC, possible open   -IV antibiotics: Zosyn  -Pre-op labs: cbc, cmp, coags, type and screen  -NPO, IVF  -Pain management  -Zofran prn  -Dvt ppx  -No MRCP needed  -Follow up AM labs  -Risks and benefits explained to patient and patient understood.  -Discussed with Dr. Tellez

## 2021-09-09 NOTE — ED PROVIDER NOTE - CLINICAL SUMMARY MEDICAL DECISION MAKING FREE TEXT BOX
40 yo M with ruq abd pain, likely cholecystitis, also possible, renal stone, uti, pancreatitis, doubt cardiac ischemia, gerd  -basic labs, lipase, lactate, rvp/covid, CT ab/pel, US abd, ekg, iv, pepcid, reglan, tylenol, morphine  -f/u results, reeval

## 2021-09-09 NOTE — H&P ADULT - NSICDXPASTMEDICALHX_GEN_ALL_CORE_FT
PAST MEDICAL HISTORY:  COVID-19 april and june    ETOH abuse     No pertinent past medical history

## 2021-09-09 NOTE — ED ADULT NURSE NOTE - ED STAT RN HANDOFF DETAILS
Report given to hold ROBBY Rod, pt resting in stretcher, axo4, 20g IV R AC intact. LR bolus running. Pt pending ROBBY amaya aware.

## 2021-09-09 NOTE — H&P ADULT - ATTENDING COMMENTS
Mr. Robertsl examined. Admitted with ETOH pancreatitis similar admission in 12/20. US and CT reviewed. Mild pancreatitis no evidence of acute cholecystitis. US demonstrated positive Jenkins's sign, no evidence of pericholecystic fluid, gb wall thickening, or stones. +Sludge    On exam he is obese, abdomen is soft, nondistended, mild RUQ/epigastric tenderness.    41M with ETOH induced pancreatitis with hyperbilirubinemia   Multimodal pain control   nothing by mouth  IVF resuscitation  MRCP Pending

## 2021-09-09 NOTE — H&P ADULT - ASSESSMENT
40 y/o male PMHx ETOH history, H. pylori c/o RUQ pain since this morning. Admitted with cholecystitis.

## 2021-09-09 NOTE — ED PROVIDER NOTE - OBJECTIVE STATEMENT
40 yo M with RUQ abd pain x 1 day, constant, + associated n/v multiple times.  No radiation of pain.  Pt. denies inciting event/trauma.  He feels otherwise well.   ROS: negative for fever, cough, headache, chest pain, shortness of breath, diarrhea, rash, paresthesia, and weakness--all other systems reviewed are negative.   PMH: Denies; Meds: Denies; SH: Denies smoking/drinking/drug use

## 2021-09-09 NOTE — ED ADULT NURSE NOTE - ISOLATION TYPE:
None Render Note In Bullet Format When Appropriate: No Medical Necessity Information: It is in your best interest to select a reason for this procedure from the list below. All of these items fulfill various CMS LCD requirements except the new and changing color options. Number Of Freeze-Thaw Cycles: 2 freeze-thaw cycles Duration Of Freeze Thaw-Cycle (Seconds): 3 Detail Level: Detailed Render Post-Care Instructions In Note?: yes Medical Necessity Clause: This procedure was medically necessary because the lesions that were treated were: Post-Care Instructions: I reviewed with the patient in detail post-care instructions. Patient is to wear sunprotection, and avoid picking at any of the treated lesions. Pt may apply Vaseline to crusted or scabbing areas. Consent: The patient's consent was obtained including but not limited to risks of crusting, scabbing, blistering, scarring, darker or lighter pigmentary change, recurrence, incomplete removal and infection.

## 2021-09-09 NOTE — H&P ADULT - NSHPSOCIALHISTORY_GEN_ALL_CORE
Occasionally smokes cigarettes. Last time patient smoked was 9/4/21 and had 2 cigarettes. Refusing nicotine patch.  ETOH abuse history. Had one beer and 3-4 shots of whiskey yesterday. CIWA: 0.  Denies drugs.

## 2021-09-09 NOTE — H&P ADULT - HISTORY OF PRESENT ILLNESS
42 y/o male PMHx ETOH history, H. pylori c/o RUQ pain since this morning. No radiation of pain. Pt states that it is similar the episode in December 2020. Vomited x 3, yellow color. Denies nausea now. Last time patient ate was yesterday. Patient denies fever, chills, constipation, diarrhea, melena, hematochezia, dysuria, hematuria, chest pain, shortness of breath, dizziness, cough.  used for Thai: Michela #[id number]. CIWA: 0. 42 y/o male PMHx ETOH history, H. pylori c/o RUQ pain since this morning. No radiation of pain. Pt states that it is similar to the episode in December 2020. Vomited x 3, yellow color. Denies nausea now. Last time patient ate was yesterday. Patient denies fever, chills, constipation, diarrhea, melena, hematochezia, dysuria, hematuria, chest pain, shortness of breath, dizziness, cough.  used for Maltese: Michela #[id number]. CIWA: 0.

## 2021-09-09 NOTE — ED ADULT NURSE NOTE - OBJECTIVE STATEMENT
Pt c/o od RUQ abdomianl pain, n/v since this morning. Norman snay diarrhea. Pt reported alcohol use every day. ayo dunbar was yesterday. Pt had 8 beers

## 2021-09-10 ENCOUNTER — TRANSCRIPTION ENCOUNTER (OUTPATIENT)
Age: 41
End: 2021-09-10

## 2021-09-10 VITALS
RESPIRATION RATE: 18 BRPM | TEMPERATURE: 98 F | OXYGEN SATURATION: 98 % | HEART RATE: 108 BPM | SYSTOLIC BLOOD PRESSURE: 126 MMHG | DIASTOLIC BLOOD PRESSURE: 86 MMHG

## 2021-09-10 DIAGNOSIS — E66.9 OBESITY, UNSPECIFIED: ICD-10-CM

## 2021-09-10 DIAGNOSIS — E87.6 HYPOKALEMIA: ICD-10-CM

## 2021-09-10 DIAGNOSIS — F10.230 ALCOHOL DEPENDENCE WITH WITHDRAWAL, UNCOMPLICATED: ICD-10-CM

## 2021-09-10 LAB
ABO RH CONFIRMATION: SIGNIFICANT CHANGE UP
ALBUMIN SERPL ELPH-MCNC: 2.9 G/DL — LOW (ref 3.3–5)
ALP SERPL-CCNC: 123 U/L — HIGH (ref 40–120)
ALT FLD-CCNC: 70 U/L — SIGNIFICANT CHANGE UP (ref 12–78)
AMYLASE P1 CFR SERPL: 40 U/L — SIGNIFICANT CHANGE UP (ref 25–115)
ANION GAP SERPL CALC-SCNC: 8 MMOL/L — SIGNIFICANT CHANGE UP (ref 5–17)
APPEARANCE UR: CLEAR — SIGNIFICANT CHANGE UP
AST SERPL-CCNC: 115 U/L — HIGH (ref 15–37)
BILIRUB SERPL-MCNC: 2.9 MG/DL — HIGH (ref 0.2–1.2)
BILIRUB UR-MCNC: NEGATIVE — SIGNIFICANT CHANGE UP
BLD GP AB SCN SERPL QL: SIGNIFICANT CHANGE UP
BUN SERPL-MCNC: 14 MG/DL — SIGNIFICANT CHANGE UP (ref 7–23)
CALCIUM SERPL-MCNC: 8.3 MG/DL — LOW (ref 8.5–10.1)
CHLORIDE SERPL-SCNC: 102 MMOL/L — SIGNIFICANT CHANGE UP (ref 96–108)
CO2 SERPL-SCNC: 27 MMOL/L — SIGNIFICANT CHANGE UP (ref 22–31)
COLOR SPEC: YELLOW — SIGNIFICANT CHANGE UP
CREAT SERPL-MCNC: 0.68 MG/DL — SIGNIFICANT CHANGE UP (ref 0.5–1.3)
DIFF PNL FLD: NEGATIVE — SIGNIFICANT CHANGE UP
ETHANOL SERPL-MCNC: <10 MG/DL — SIGNIFICANT CHANGE UP (ref 0–10)
GLUCOSE SERPL-MCNC: 90 MG/DL — SIGNIFICANT CHANGE UP (ref 70–99)
GLUCOSE UR QL: NEGATIVE MG/DL — SIGNIFICANT CHANGE UP
HCT VFR BLD CALC: 38.9 % — LOW (ref 39–50)
HGB BLD-MCNC: 13.8 G/DL — SIGNIFICANT CHANGE UP (ref 13–17)
KETONES UR-MCNC: ABNORMAL
LEUKOCYTE ESTERASE UR-ACNC: NEGATIVE — SIGNIFICANT CHANGE UP
LIDOCAIN IGE QN: 274 U/L — SIGNIFICANT CHANGE UP (ref 73–393)
MAGNESIUM SERPL-MCNC: 1.7 MG/DL — SIGNIFICANT CHANGE UP (ref 1.6–2.6)
MAGNESIUM SERPL-MCNC: 1.8 MG/DL — SIGNIFICANT CHANGE UP (ref 1.6–2.6)
MCHC RBC-ENTMCNC: 32.5 PG — SIGNIFICANT CHANGE UP (ref 27–34)
MCHC RBC-ENTMCNC: 35.5 GM/DL — SIGNIFICANT CHANGE UP (ref 32–36)
MCV RBC AUTO: 91.7 FL — SIGNIFICANT CHANGE UP (ref 80–100)
NITRITE UR-MCNC: NEGATIVE — SIGNIFICANT CHANGE UP
NRBC # BLD: 0 /100 WBCS — SIGNIFICANT CHANGE UP (ref 0–0)
PH UR: 7 — SIGNIFICANT CHANGE UP (ref 5–8)
PHOSPHATE SERPL-MCNC: 2.5 MG/DL — SIGNIFICANT CHANGE UP (ref 2.5–4.5)
PHOSPHATE SERPL-MCNC: 2.8 MG/DL — SIGNIFICANT CHANGE UP (ref 2.5–4.5)
PLATELET # BLD AUTO: 224 K/UL — SIGNIFICANT CHANGE UP (ref 150–400)
POTASSIUM SERPL-MCNC: 3.3 MMOL/L — LOW (ref 3.5–5.3)
POTASSIUM SERPL-MCNC: 3.4 MMOL/L — LOW (ref 3.5–5.3)
POTASSIUM SERPL-SCNC: 3.3 MMOL/L — LOW (ref 3.5–5.3)
POTASSIUM SERPL-SCNC: 3.4 MMOL/L — LOW (ref 3.5–5.3)
PROT SERPL-MCNC: 6.6 GM/DL — SIGNIFICANT CHANGE UP (ref 6–8.3)
PROT UR-MCNC: NEGATIVE MG/DL — SIGNIFICANT CHANGE UP
RBC # BLD: 4.24 M/UL — SIGNIFICANT CHANGE UP (ref 4.2–5.8)
RBC # FLD: 11.9 % — SIGNIFICANT CHANGE UP (ref 10.3–14.5)
SODIUM SERPL-SCNC: 137 MMOL/L — SIGNIFICANT CHANGE UP (ref 135–145)
SP GR SPEC: 1.01 — SIGNIFICANT CHANGE UP (ref 1.01–1.02)
UROBILINOGEN FLD QL: 1 MG/DL
WBC # BLD: 9.14 K/UL — SIGNIFICANT CHANGE UP (ref 3.8–10.5)
WBC # FLD AUTO: 9.14 K/UL — SIGNIFICANT CHANGE UP (ref 3.8–10.5)

## 2021-09-10 PROCEDURE — 99253 IP/OBS CNSLTJ NEW/EST LOW 45: CPT

## 2021-09-10 PROCEDURE — 74181 MRI ABDOMEN W/O CONTRAST: CPT | Mod: 26

## 2021-09-10 RX ORDER — HYDRALAZINE HCL 50 MG
5 TABLET ORAL ONCE
Refills: 0 | Status: COMPLETED | OUTPATIENT
Start: 2021-09-10 | End: 2021-09-10

## 2021-09-10 RX ORDER — FOLIC ACID 0.8 MG
1 TABLET ORAL DAILY
Refills: 0 | Status: DISCONTINUED | OUTPATIENT
Start: 2021-09-10 | End: 2021-09-10

## 2021-09-10 RX ORDER — POTASSIUM CHLORIDE 20 MEQ
10 PACKET (EA) ORAL
Refills: 0 | Status: COMPLETED | OUTPATIENT
Start: 2021-09-10 | End: 2021-09-10

## 2021-09-10 RX ORDER — HYDRALAZINE HCL 50 MG
10 TABLET ORAL EVERY 6 HOURS
Refills: 0 | Status: DISCONTINUED | OUTPATIENT
Start: 2021-09-10 | End: 2021-09-10

## 2021-09-10 RX ORDER — THIAMINE MONONITRATE (VIT B1) 100 MG
100 TABLET ORAL DAILY
Refills: 0 | Status: DISCONTINUED | OUTPATIENT
Start: 2021-09-11 | End: 2021-09-10

## 2021-09-10 RX ORDER — THIAMINE MONONITRATE (VIT B1) 100 MG
100 TABLET ORAL ONCE
Refills: 0 | Status: COMPLETED | OUTPATIENT
Start: 2021-09-10 | End: 2021-09-10

## 2021-09-10 RX ADMIN — ENOXAPARIN SODIUM 40 MILLIGRAM(S): 100 INJECTION SUBCUTANEOUS at 14:19

## 2021-09-10 RX ADMIN — Medication 100 MILLIEQUIVALENT(S): at 08:52

## 2021-09-10 RX ADMIN — Medication 100 MILLIGRAM(S): at 11:27

## 2021-09-10 RX ADMIN — PIPERACILLIN AND TAZOBACTAM 25 GRAM(S): 4; .5 INJECTION, POWDER, LYOPHILIZED, FOR SOLUTION INTRAVENOUS at 05:32

## 2021-09-10 RX ADMIN — Medication 100 MILLIEQUIVALENT(S): at 05:32

## 2021-09-10 RX ADMIN — Medication 10 MILLIGRAM(S): at 12:41

## 2021-09-10 RX ADMIN — SODIUM CHLORIDE 140 MILLILITER(S): 9 INJECTION, SOLUTION INTRAVENOUS at 06:28

## 2021-09-10 RX ADMIN — Medication 2 MILLIGRAM(S): at 14:17

## 2021-09-10 RX ADMIN — Medication 1 MILLIGRAM(S): at 11:27

## 2021-09-10 RX ADMIN — Medication 100 MILLIEQUIVALENT(S): at 06:27

## 2021-09-10 RX ADMIN — Medication 1 TABLET(S): at 11:27

## 2021-09-10 RX ADMIN — Medication 5 MILLIGRAM(S): at 06:24

## 2021-09-10 RX ADMIN — Medication 2 MILLIGRAM(S): at 07:45

## 2021-09-10 NOTE — DISCHARGE NOTE PROVIDER - NSDCCPCAREPLAN_GEN_ALL_CORE_FT
PRINCIPAL DISCHARGE DIAGNOSIS  Diagnosis: Pancreatitis  Assessment and Plan of Treatment: Passed a stone, MRCP negative for Choledocholithiasis, cholelithiasis or ductal dilatation.

## 2021-09-10 NOTE — DISCHARGE NOTE PROVIDER - NSDCMRMEDTOKEN_GEN_ALL_CORE_FT
chlordiazePOXIDE 25 mg oral capsule: 2 cap(s) orally every 6 hours x1 day, then 2 caps orally every 8 hours x1 day, then 2 caps orally every 12 hours x1 day, and finally 2 caps orally x 1 day (Taper Protocl) MDD:8

## 2021-09-10 NOTE — DISCHARGE NOTE NURSING/CASE MANAGEMENT/SOCIAL WORK - PATIENT PORTAL LINK FT
You can access the FollowMyHealth Patient Portal offered by [facility] by registering at the following website: [email or web address]. By joining [facility]’s FollowMyHealth portal, you will also be able to view your health information using other applications (apps) compatible with our system.

## 2021-09-10 NOTE — DISCHARGE NOTE NURSING/CASE MANAGEMENT/SOCIAL WORK - NSDCPEFALRISK_GEN_ALL_CORE
For information on Fall & injury Prevention, visit [email or web address]

## 2021-09-10 NOTE — DISCHARGE NOTE PROVIDER - CARE PROVIDER_API CALL
Aman Tellez (MD)  Surgery  Critical Care  [address], [address] [place] [address]  Phone: [phone]  Fax: [phone]  Follow Up Time:

## 2021-09-10 NOTE — PROGRESS NOTE ADULT - SUBJECTIVE AND OBJECTIVE BOX
SURGERY PROGRESS HPI:  Pt seen and examined at bedside. Denies pain overnight. Pt denies complaints. Pt denies nausea and vomiting. Normal BM/flatus. Voiding well. Pt denies chest pain, SOB, dizziness, fever, chills. Ambulating.      Vital Signs Last 24 Hrs  T(C): 36.7 (10 Sep 2021 05:27), Max: 36.9 (09 Sep 2021 23:07)  T(F): 98 (10 Sep 2021 05:27), Max: 98.5 (09 Sep 2021 23:07)  HR: 102 (10 Sep 2021 07:02) (89 - 102)  BP: 134/84 (10 Sep 2021 07:02) (134/84 - 168/100)  BP(mean): --  RR: 18 (10 Sep 2021 07:02) (17 - 18)  SpO2: 98% (10 Sep 2021 07:02) (95% - 99%)      PHYSICAL EXAM:    GENERAL: NAD  HEAD:  Atraumatic, Normocephalic  CHEST/LUNG: Clear to ausculation, bilaterally   HEART: RRR S1S2  ABDOMEN: non distended, +BS, soft, mild RUQ tenderness, no guarding  EXTREMITIES:  calf soft, non tender b/l    I&O's Detail      LABS:                        13.8   9.14  )-----------( 224      ( 10 Sep 2021 03:09 )             38.9     09-10    137  |  102  |  14  ----------------------------<  90  3.3<L>   |  27  |  0.68    Ca    8.3<L>      10 Sep 2021 03:09  Phos  2.8     09-10  Mg     1.7     09-10    TPro  6.6  /  Alb  2.9<L>  /  TBili  2.9<H>  /  DBili  x   /  AST  115<H>  /  ALT  70  /  AlkPhos  123<H>  09-10    PT/INR - ( 09 Sep 2021 23:19 )   PT: 13.9 sec;   INR: 1.21 ratio      PTT - ( 09 Sep 2021 23:19 )  PTT:30.7 sec    < from: CT Abdomen and Pelvis w/ Oral Cont and w/ IV Cont (09.09.21 @ 22:23) >  FINDINGS:  The heart is not enlarged. The lung bases are clear.    The large and small bowel are normal in caliber without obstruction. There is no free intraperitoneal air or abdominal abscess.  The appendix is normal. There is no abnormal bowel wall thickening or inflammatory change. There is a small periampullary duodenal diverticulum.    The liver is enlarged and of diffuse lower attenuation than spleen suggesting hepatomegaly and hepatic steatosis. The gallbladder is mildly distended but without wall thickening or pericholecystic inflammatory change. Subtle hazy appearance to the peripancreatic fat adjacent to the head and uncinate process which appear mildly prominent as compared to the rest of the gland. The spleen andadrenal glands are unremarkable. The kidneys enhance symmetrically without hydronephrosis.    The abdominal aorta is normal in caliber. There is no retroperitoneal, pelvic or inguinal adenopathy. There is no ascites.    The urinary bladder is unremarkable.    The visualized osseous structures demonstrate no acute abnormality. Grade 1 anterior spondylolisthesis of L5 on S1 due to bilateral pars interarticularis defects at L5.    IMPRESSION:  Acute very mild pancreatitis question in the head and uncinate process. Correlation with pancreatic enzyme levels is recommended.    Hepatomegaly and hepatic steatosis. No cholecystitis.    < end of copied text >    < from: US Abdomen Complete (US Abdomen Complete .) (09.09.21 @ 20:55) >  IMPRESSION:  Limited study due to extensive shadowing bowel gas.  Gallbladder sludge and positive sonographic Jenkins sign. No wall thickening or pericholecystic fluid.  Hepatomegaly and hepatic steatosis.    < end of copied text >          Assessment: 42 y/o male PMHx ETOH history, H. pylori c/o RUQ pain since this morning. US shows gallbladder sludge. No wall thickening or pericholecystic fluid. CT shows Acute very mild pancreatitis question in the head and uncinate process. Hepatomegaly and hepatic steatosis. Bilirubin increased 2.0 -> 2.9.    Plan:  -f/u MRCP  -OR booked for for laparoscopic cholecystectomy with IOC, possible open as add-on  -IV antibiotics: Zosyn  -Pre-op labs: cbc, cmp, coags, type and screen  -NPO, IVF  -Pain management  -Zofran prn  -Dvt ppx  -Discussed with Dr. Tellez

## 2021-09-10 NOTE — CONSULT NOTE ADULT - PROBLEM SELECTOR RECOMMENDATION 2
K 3.3, received replacement  Monitor K, Mg, phosphorus  Monitor and replace serum electrolytes accordingly

## 2021-09-10 NOTE — CONSULT NOTE ADULT - PROBLEM SELECTOR RECOMMENDATION 9
h/o ETOH use, admitted for cholecystitis  Hospital course complicated by ETOH withdrawal  CIWA protocol  thiamine, folic acid and multivitamin

## 2021-09-10 NOTE — CONSULT NOTE ADULT - ASSESSMENT
41 years old male patient with h/o ETOH dependence, cocaine use present to ED with complain of RUQ pain associated with multiple episodes of N/V. Patient is admitted for cholecystitis with plan for laparoscopic cholecystectomy. Currently on IV zosyn. Medicine is consulted for ETOH withdrawal    CIWA protocol, thiamine, folic acid and multivitamin  Patients who are actively abusing cocaine and amphetamines are at significant risk during anesthesia because of intraoperative hemodynamic instability. The increased myocardial oxygen demand, coupled with decreased coronary blood flow from vasospasm and vasoconstriction, can cause acute myocardial infarction, cardiac arrhythmias and sudden death.  I strongly advised the patient regarding cessation of cocaine use  41 years old male patient with h/o ETOH dependence, cocaine use present to ED with complain of RUQ pain associated with multiple episodes of N/V. Patient is admitted for cholecystitis with plan for laparoscopic cholecystectomy. Currently on IV zosyn. Medicine is consulted for ETOH withdrawal    CIWA protocol, thiamine, folic acid and multivitamin  Patients who are actively abusing cocaine and amphetamines are at significant risk during anesthesia because of intraoperative hemodynamic instability. The increased myocardial oxygen demand, coupled with decreased coronary blood flow from vasospasm and vasoconstriction, can cause acute myocardial infarction, cardiac arrhythmias and sudden death.  I strongly advised the patient regarding cessation of cocaine use     Addendum  Received call from surgery team that patient is being planned for discharged today. CIWA score currently is 0 and patient is comfortable.   May discharge patient with Librium taper.  Librium 50mg every 6hr for 1 days, then every 8 hr for 1 days, then 50mg every 12 hours for 1 days and end with 50mg for 1 day. Please kindly instruct patient not to drink alcohol if he is taking Librium, which can result in severe intoxication/drowsiness and possible death.   Discussed with surgery team

## 2021-09-10 NOTE — DISCHARGE NOTE PROVIDER - NSDCFUADDINST_GEN_ALL_CORE_FT
DO NOT drink alcohol while on Librium Taper, May result in severe intoxication/drowsiness and possible death. Take Librium as prescribed.

## 2021-09-10 NOTE — DISCHARGE NOTE PROVIDER - NSTOBACCOUSAGEY/N_GEN_A_CS
I understand patient choose to proceed with hospice; we need to give him on hydration 1st see there is any improvement and make that decision and hopefully patient will return to clinic for another assessment before he is finally going to hospice   Yes

## 2021-09-10 NOTE — DISCHARGE NOTE PROVIDER - NSDCFUADDAPPT_GEN_ALL_CORE_FT
Follow up with your PCP regarding your recent hospitalization.   Follow up in the office for elective cholecystectomy.

## 2021-09-10 NOTE — CONSULT NOTE ADULT - SUBJECTIVE AND OBJECTIVE BOX
HPI  41 years old male patient with h/o ETOH dependence, cocaine use present to ED with complain of RUQ pain associated with multiple episodes of N/V. Patient is admitted for cholecystitis with plan for laparoscopic cholecystectomy. Currently on IV zosyn. Medicine is consulted for ETOH withdrawal.  Patient normally drink 1/2 bottle of whisky or tequila daily. When he goes 1 day without alcohol, he will start to feel withdrawal. Denied any prior history of complicated withdrawal such as hallucination or seizure. Last drink was on Wed  Patient reported feeling significantly better after IV ativan 2mg he received. He still feel a little anxious, tremulous and mildly sweaty    PMH: as in HPI  PSH: none  SH: ETOH use, occasional smoke cigarettes and occasionally use cocaine ( last use was on Wed)  Allergy: NKDA  FH: no family history of significant medical comorbidities    ROS  Constitutional: No fever, chills or malaise  HEENT:  No hearing changes or  vision changes  Cardiovascular:  No Chest Pain, palpitation, SOB orthopnea or PND  Respiratory:  No cough, SOB or wheezing  Gastrointestinal:  N/V and RUQ pain  Genitourinary: No dysuria, frequency or hematuria  Musculoskeletal:  No myalgia or joint pain  Skin:  No skin lesions or pruritis  Neuro:  anxiety, palpitation, sweaty  Psych:  feeling of alcohol withdrawal  Heme/Lymph:  No easy bruising or bleeding  Endocrine:  No Polyuria, polydipsia, No heat or cold intolerance     Physical Exam  GENERAL APPEARANCE: Well developed, well nourished, alert and cooperative, no acute distress. Appear mildly tremulous  HEAD: normocephalic, atraumatic  EYES: PERRL, EOMI  ENT: Mucosa moist, tongue normal.  NECK: Neck supple, trachea midline, non-tender  CARDIAC: Normal S1 and S2. Regular rate and rhythms. No murmurs  LUNGS: Clear to auscultation, equal air entry both lungs. No rales, rhonchi, wheezing  ABDOMEN:Soft, nondistended, nontender. No guarding or rebound tenderness.  Back: Spine normal without deformity or tenderness  EXTREMITIES: No significant deformity or joint abnormality. No edema. Peripheral pulses intact  NEUROLOGICAL: No gross motor or sensory deficits. CN II-XII grossly intact.  SKIN:no lesions or eruptions.  PSYCHIATRIC: A&O x 3, appropriate mood and affect.  [facility]   ID- [id number]  41 years old male patient with h/o ETOH dependence, cocaine use present to ED with complain of RUQ pain associated with multiple episodes of N/V. Patient is admitted for cholecystitis with plan for laparoscopic cholecystectomy. Currently on IV zosyn. Medicine is consulted for ETOH withdrawal.  Patient normally drink 1/2 bottle of whisky or tequila daily. When he goes 1 day without alcohol, he will start to feel withdrawal. Denied any prior history of complicated withdrawal such as hallucination or seizure. Last drink was on Wed  Patient reported feeling significantly better after IV ativan 2mg he received. He still feel a little anxious, tremulous and mildly sweaty    PMH: as in HPI  PSH: none  SH: ETOH use, occasional smoke cigarettes and occasionally use cocaine ( last use was on Wed)  Allergy: NKDA  FH: no family history of significant medical comorbidities    ROS  Constitutional: No fever, chills or malaise  HEENT:  No hearing changes or  vision changes  Cardiovascular:  No Chest Pain, palpitation, SOB orthopnea or PND  Respiratory:  No cough, SOB or wheezing  Gastrointestinal:  N/V and RUQ pain  Genitourinary: No dysuria, frequency or hematuria  Musculoskeletal:  No myalgia or joint pain  Skin:  No skin lesions or pruritis  Neuro:  anxiety, palpitation, sweaty  Psych:  feeling of alcohol withdrawal  Heme/Lymph:  No easy bruising or bleeding  Endocrine:  No Polyuria, polydipsia, No heat or cold intolerance     Physical Exam  GENERAL APPEARANCE: Well developed, well nourished, alert and cooperative, no acute distress. Appear mildly tremulous  HEAD: normocephalic, atraumatic  EYES: PERRL, EOMI  ENT: Mucosa moist, tongue normal.  NECK: Neck supple, trachea midline, non-tender  CARDIAC: Normal S1 and S2. Regular rate and rhythms. No murmurs  LUNGS: Clear to auscultation, equal air entry both lungs. No rales, rhonchi, wheezing  ABDOMEN:Soft, nondistended, nontender. No guarding or rebound tenderness.  Back: Spine normal without deformity or tenderness  EXTREMITIES: No significant deformity or joint abnormality. No edema. Peripheral pulses intact  NEUROLOGICAL: No gross motor or sensory deficits. CN II-XII grossly intact.  SKIN:no lesions or eruptions.  PSYCHIATRIC: A&O x 3, appropriate mood and affect.

## 2021-09-10 NOTE — PROGRESS NOTE ADULT - SUBJECTIVE AND OBJECTIVE BOX
Pre-operative Note    - Pre-operative Diagnosis: Cholecystitis     - Procedure: laparoscopic cholecystectomy with IOC, possible open    - Surgeon: Dr. Tellez        - Labs:                        16.0   11.68 )-----------( 285      ( 09 Sep 2021 19:25 )             45.2     09-09    136  |  101  |  18  ----------------------------<  102<H>  3.9   |  26  |  0.77    Ca    9.1      09 Sep 2021 19:25    TPro  8.3  /  Alb  3.5  /  TBili  2.0<H>  /  DBili  x   /  AST  157<H>  /  ALT  86<H>  /  AlkPhos  146<H>  09-09    PT/INR - ( 09 Sep 2021 23:19 )   PT: 13.9 sec;   INR: 1.21 ratio      PTT - ( 09 Sep 2021 23:19 )  PTT:30.7 sec    - CXR: ordered    - EKG: ordered    - Blood: Not needed.     - Orders:  > NPO, IVF  > On Zosyn  > Labs: CBC, CMP, coags, type & screen    - Permits:  > Consent in chart  > Case scheduled with nursing supervisor, requested 7:30am  > Covid negative

## 2021-09-13 DIAGNOSIS — K81.9 CHOLECYSTITIS, UNSPECIFIED: ICD-10-CM

## 2021-09-13 DIAGNOSIS — E87.6 HYPOKALEMIA: ICD-10-CM

## 2021-09-13 DIAGNOSIS — K85.20 ALCOHOL INDUCED ACUTE PANCREATITIS WITHOUT NECROSIS OR INFECTION: ICD-10-CM

## 2021-09-13 DIAGNOSIS — K81.0 ACUTE CHOLECYSTITIS: ICD-10-CM

## 2021-09-13 DIAGNOSIS — F10.139 ALCOHOL ABUSE WITH WITHDRAWAL, UNSPECIFIED: ICD-10-CM

## 2021-09-13 DIAGNOSIS — E66.9 OBESITY, UNSPECIFIED: ICD-10-CM

## 2021-09-22 LAB
6-ACETYLCODEINE UR CFM-MCNC: NEGATIVE NG/ML — SIGNIFICANT CHANGE UP
AMPHET UR-MCNC: NEGATIVE — SIGNIFICANT CHANGE UP
BARBITURATES, URINE.: NEGATIVE — SIGNIFICANT CHANGE UP
BENZODIAZ UR-MCNC: NEGATIVE — SIGNIFICANT CHANGE UP
BZE UR CFM-MCNC: SIGNIFICANT CHANGE UP NG/ML
COCAINE METAB.OTHER UR-MCNC: SIGNIFICANT CHANGE UP
CODEINE UR CFM-MCNC: SIGNIFICANT CHANGE UP NG/ML
CREATININE, URINE THERAPEUTIC: 190.8 MG/DL — SIGNIFICANT CHANGE UP
HYDROCODONE UR CFM-MCNC: SIGNIFICANT CHANGE UP NG/ML
HYDROMORPHONE UR CFM-MCNC: SIGNIFICANT CHANGE UP NG/ML
METHADONE UR-MCNC: NEGATIVE — SIGNIFICANT CHANGE UP
METHAQUALONE UR QL: NEGATIVE — SIGNIFICANT CHANGE UP
METHAQUALONE UR-MCNC: NEGATIVE — SIGNIFICANT CHANGE UP
MORPHINE UR QL CFM: 5268 NG/ML — SIGNIFICANT CHANGE UP
OPIATES UR-MCNC: SIGNIFICANT CHANGE UP
PCP UR-MCNC: NEGATIVE — SIGNIFICANT CHANGE UP
PROPOXYPH UR QL: NEGATIVE — SIGNIFICANT CHANGE UP
THC UR QL: NEGATIVE — SIGNIFICANT CHANGE UP

## 2021-10-01 NOTE — ED ADULT NURSE NOTE - CADM POA CENTRAL LINE
Theo Jones is a 68 year old male established patient with bilateral cataracts  here for a 16 month follow up. His lasr exam was 6/12/20.  He is complaining of occasional redness in the left eye. 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  Patient states his floaters are stable and have remained unchanged for a few years.   He currently wears over the counter readers only, + 2.75.  Theo has no history of surgery or trauma to the eye    Patient Active Problem List   Diagnosis   • High-density lipoprotein deficiency   • Idiopathic chronic gout of multiple sites with tophus   • Elevated fasting blood sugar   • Metabolic syndrome     Current Outpatient Medications   Medication Sig Dispense Refill   • ibuprofen 200 MG capsule        No current facility-administered medications for this visit.       ALLERGIES:   Allergen Reactions   • Lopurin RASH       Electronically signed by: Preeti Langley MA  10/5/2021       Base Eye Exam     Visual Acuity (Snellen - Linear)       Right Left    Dist sc 20/50 +2 20/40    Near cc J2@16 J2@16   OU : SC  20/30-1           Tonometry (Applanation, 9:14 AM)       Right Left    Pressure 17 14          Pupils       Pupils    Right PERRL    Left PERRL          Visual Fields       Left Right     Full Full          Extraocular Movement       Right Left     Full Full          Neuro/Psych     Oriented x3: Yes          Dilation     Both eyes: 1% Mydriacyl / 2.5% Neosynephrine  @ 9:15 AM            Additional Tests     Keratometry       K1 Axis K2 Axis Mires    Right 43.25 180 43.25 180 D0    Left 43.25 180 43.87 090 D0            Slit Lamp and Fundus Exam     External Exam       Right Left    External Normal Normal          Slit Lamp Exam       Right Left    Lids/Lashes Normal Normal    Conjunctiva/Sclera Clear Clear    Cornea Clear Clear    Anterior Chamber Deep and quiet Deep and quiet    Iris Round and regular Round and regular    Lens  1+ Nuclear sclerosis 1+ Nuclear sclerosis    Vitreous  1+ Asteroid hyalosis          Fundus Exam       Right Left    Disc Flat, pink with a healthy rim Flat, pink with a healthy rim    C/D Ratio 0.1 0.2    Macula intact intact    Vessels patent patent    Periphery Flat, healthy, without tears or detachments Flat, healthy, without tears or detachments            Refraction     Wearing Rx       Sphere    Right +2.75    Left +2.75    Type: OTC readers          Manifest Refraction       Sphere Cylinder [place] Dist VA Add Near VA    Right +1.00 +0.50 160 20/20- +2.25 20/20    Left +1.00 +0.25 010 20/20- +2.25 20/20              ASSESSMENT:  Cataracts ou not affecting lifestyle  Dry eyes ou  Vitreous hyalosis os stable  Compound hyperopic astigmatism both eyes  presbyopia      PLAN:  Discussed cataracts  Discussed dry eyes.  You can try an artificial tear drop if desires such as Systane Ultra or Theratears  Cont w/ an otc reader  Return to clinic for yearly follow up examination.  ASAP if any ocular problems arise.  i.e reddnes, pain, decrease/change  in vision          No

## 2023-05-08 NOTE — ED ADULT NURSE NOTE - CAS DISCH BELONGINGS RETURNED
How Severe Is Your Skin Lesion?: mild
Has Your Skin Lesion Been Treated?: not been treated
Is This A New Presentation, Or A Follow-Up?: Skin Lesion
Not applicable

## 2024-03-06 PROBLEM — F10.10 ALCOHOL ABUSE, UNCOMPLICATED: Chronic | Status: ACTIVE | Noted: 2021-09-09

## 2024-03-07 ENCOUNTER — APPOINTMENT (OUTPATIENT)
Age: 44
End: 2024-03-07
Payer: MEDICAID

## 2024-03-07 ENCOUNTER — OUTPATIENT (OUTPATIENT)
Dept: OUTPATIENT SERVICES | Facility: HOSPITAL | Age: 44
LOS: 1 days | End: 2024-03-07

## 2024-03-07 VITALS
BODY MASS INDEX: 33.75 KG/M2 | DIASTOLIC BLOOD PRESSURE: 76 MMHG | HEIGHT: 66 IN | OXYGEN SATURATION: 96 % | RESPIRATION RATE: 14 BRPM | SYSTOLIC BLOOD PRESSURE: 115 MMHG | HEART RATE: 92 BPM | WEIGHT: 210 LBS

## 2024-03-07 DIAGNOSIS — R10.11 RIGHT UPPER QUADRANT PAIN: ICD-10-CM

## 2024-03-07 PROCEDURE — 99215 OFFICE O/P EST HI 40 MIN: CPT

## 2024-03-07 NOTE — PLAN
[FreeTextEntry1] : RUQ pain vs. GERD -possibly gallbladder stones, vitals stable -RUQ ultrasound ordered  -pt asked to not take Naproxen anymore, asked to buy lidocaine patch otc for his knee pain or voltaran gel  Pt also counseled that if pain gets much worse or if jaundice or fevers/chills---to go straight to ER

## 2024-03-07 NOTE — PHYSICAL EXAM
[No Acute Distress] : no acute distress [Well Nourished] : well nourished [Well Developed] : well developed [No Respiratory Distress] : no respiratory distress  [No Accessory Muscle Use] : no accessory muscle use [Clear to Auscultation] : lungs were clear to auscultation bilaterally [Normal Rate] : normal rate  [Regular Rhythm] : with a regular rhythm [Normal S1, S2] : normal S1 and S2 [Normal] : normal rate, regular rhythm, normal S1 and S2 and no murmur heard [Soft] : abdomen soft [de-identified] : very tender to palpation over RUQ region

## 2024-03-07 NOTE — REVIEW OF SYSTEMS
[Fever] : no fever [Fatigue] : no fatigue [Chills] : no chills [Palpitations] : no palpitations [Chest Pain] : no chest pain [Shortness Of Breath] : no shortness of breath [Claudication] : no  leg claudication [Cough] : no cough [Wheezing] : no wheezing [Nausea] : no nausea [Abdominal Pain] : abdominal pain [Constipation] : no constipation [Diarrhea] : no diarrhea [Heartburn] : heartburn [Vomiting] : no vomiting [Negative] : Respiratory

## 2024-03-07 NOTE — HISTORY OF PRESENT ILLNESS
[FreeTextEntry8] : 42yo M with PMH Of right meniscus injury who takes Naproxen 500mg BID for few months now---comes in complaining of abdominal pain. Patient points mostly to RUQ pain, states that pain comes and goes, at times it's after eating, but not always.  Patient states he does have a hx of H.Pylori in the past.  Denies fevers, chills, denies chest pain, palpitations or sob. no diarrhea/ or constipation or nausea.

## 2024-03-08 DIAGNOSIS — R10.11 RIGHT UPPER QUADRANT PAIN: ICD-10-CM

## 2024-04-15 NOTE — CONSULT NOTE ADULT - PROBLEM SELECTOR PROBLEM 1
Alcohol withdrawal syndrome without complication Group Topic:  Education    Date: 4/15/2024  Start Time:  1:40 PM  End Time:  2:30 PM  Facilitators: Lillian Woodard RN    Focus: 8 Dimensions of Wellness  Number in attendance: 8    Method: Group  Attendance: Present  Participation: Active  Patient Response: Appropriate feedback and Attentive  Mood: Depressed  Affect: Type: Depressed   Range: Blunted/flat   Congruency: Congruent   Stability: Stable  Behavior/Socialization: Appropriate to group, Cooperative, and Engaged  Thought Process: Focused and Goal-directed  Task Performance: Follows directions  Patient Evaluation: Independent - full participation  Writer educated and discussed the 8 Dimensions of Wellness including physical wellness, financial wellness, intellectual wellness, environmental wellness, spiritual wellness, social wellness, occupational wellness, and emotional wellness. These dimensions are interconnected, one dimension building on another. Patients completed a self-care assessment to identify dimensions they are doing well with and dimensions they would like to improve.     Patient was active in group discussion on the 8 dimensions of wellness. Patient shared he wants to make improvements is all 8 dimensions, but particularly in the emotional and social dimensions.

## 2024-05-07 ENCOUNTER — OUTPATIENT (OUTPATIENT)
Dept: OUTPATIENT SERVICES | Facility: HOSPITAL | Age: 44
LOS: 1 days | End: 2024-05-07

## 2024-05-07 ENCOUNTER — APPOINTMENT (OUTPATIENT)
Age: 44
End: 2024-05-07
Payer: COMMERCIAL

## 2024-05-07 VITALS
DIASTOLIC BLOOD PRESSURE: 74 MMHG | HEART RATE: 77 BPM | HEIGHT: 66 IN | TEMPERATURE: 98.7 F | WEIGHT: 210 LBS | OXYGEN SATURATION: 98 % | SYSTOLIC BLOOD PRESSURE: 118 MMHG | BODY MASS INDEX: 33.75 KG/M2

## 2024-05-07 DIAGNOSIS — Z80.49 FAMILY HISTORY OF MALIGNANT NEOPLASM OF OTHER GENITAL ORGANS: ICD-10-CM

## 2024-05-07 DIAGNOSIS — R10.13 EPIGASTRIC PAIN: ICD-10-CM

## 2024-05-07 DIAGNOSIS — Z78.9 OTHER SPECIFIED HEALTH STATUS: ICD-10-CM

## 2024-05-07 DIAGNOSIS — Z00.00 ENCOUNTER FOR GENERAL ADULT MEDICAL EXAMINATION W/OUT ABNORMAL FINDINGS: ICD-10-CM

## 2024-05-07 PROCEDURE — 99396 PREV VISIT EST AGE 40-64: CPT | Mod: 25

## 2024-05-07 PROCEDURE — 99000 SPECIMEN HANDLING OFFICE-LAB: CPT

## 2024-05-07 NOTE — PHYSICAL EXAM
[No Acute Distress] : no acute distress [Well Developed] : well developed [Well-Appearing] : well-appearing [No Lymphadenopathy] : no lymphadenopathy [Supple] : supple [Thyroid Normal, No Nodules] : the thyroid was normal and there were no nodules present [Soft] : abdomen soft [Non-distended] : non-distended [No Joint Swelling] : no joint swelling [Grossly Normal Strength/Tone] : grossly normal strength/tone [Speech Grossly Normal] : speech grossly normal [Memory Grossly Normal] : memory grossly normal [Normal Affect] : the affect was normal [Normal] : affect was normal and insight and judgment were intact [de-identified] : Epigastric tenderness on exam [de-identified] : Back tenderness and right shoulder tenderness on exam

## 2024-05-07 NOTE — REVIEW OF SYSTEMS
[Abdominal Pain] : abdominal pain [Back Pain] : back pain [Negative] : Heme/Lymph [Melena] : no melena [FreeTextEntry7] : Epigastric pain that radiates to the back [FreeTextEntry9] : Lower back pain

## 2024-05-07 NOTE — PLAN
[FreeTextEntry1] : 42 yo M with h/o right knee arthroplasty in 2023 comes in today for annual exam. Annual labs ordered: CBC, CMP, lipid panel, A1C, Hep C, HIV, Syphilis screen Epigastric pain radiating to the back. Epigastric tenderness of examination. H/o chronic naproxen use. Referral to GI  for EGD. Healthy eating, exercise. RTC 1 week for results.

## 2024-05-07 NOTE — HEALTH RISK ASSESSMENT
[Very Good] : ~his/her~  mood as very good [1 or 2 (0 pts)] : 1 or 2 (0 points) [Never (0 pts)] : Never (0 points) [No] : In the past 12 months have you used drugs other than those required for medical reasons? No [No falls in past year] : Patient reported no falls in the past year [Little interest or pleasure doing things] : 1) Little interest or pleasure doing things [Feeling down, depressed, or hopeless] : 2) Feeling down, depressed, or hopeless [0] : 2) Feeling down, depressed, or hopeless: Not at all (0) [PHQ-2 Negative - No further assessment needed] : PHQ-2 Negative - No further assessment needed [HIV Test offered] : HIV Test offered [Hepatitis C test offered] : Hepatitis C test offered [None] : None [Alone] : lives alone [Employed] : employed [Less Than High School] : less than high school [] :  [# Of Children ___] : has [unfilled] children [Feels Safe at Home] : Feels safe at home [Fully functional (bathing, dressing, toileting, transferring, walking, feeding)] : Fully functional (bathing, dressing, toileting, transferring, walking, feeding) [Fully functional (using the telephone, shopping, preparing meals, housekeeping, doing laundry, using] : Fully functional and needs no help or supervision to perform IADLs (using the telephone, shopping, preparing meals, housekeeping, doing laundry, using transportation, managing medications and managing finances) [Smoke Detector] : smoke detector [Carbon Monoxide Detector] : carbon monoxide detector [Seat Belt] :  uses seat belt [Never] : Never [0-4] : 0-4 [FreeTextEntry1] : Low back and epigastric pain x 2 months [de-identified] : One visit January 2024 for lung infection [de-identified] : None [de-identified] : Used to drink a lot in the past but since January has not consumed alcohol [Audit-CScore] : 0 [de-identified] : Walks the horses [de-identified] : Eats everything [UEQ3Nenzt] : 0 [Change in mental status noted] : No change in mental status noted [Language] : denies difficulty with language [Behavior] : denies difficulty with behavior [Sexually Active] : not sexually active [High Risk Behavior] : no high risk behavior [Reports changes in hearing] : Reports no changes in hearing [Reports changes in vision] : Reports no changes in vision [Reports changes in dental health] : Reports no changes in dental health [Guns at Home] : no guns at home [TB Exposure] : is not being exposed to tuberculosis [FreeTextEntry2] :  at [place]

## 2024-05-07 NOTE — HISTORY OF PRESENT ILLNESS
[de-identified] : 43 y o m comes in for an annual visit. He c/o low back and epigastric pain x 2 months

## 2024-05-09 LAB
ALBUMIN SERPL ELPH-MCNC: 4.3 G/DL
ALP BLD-CCNC: 146 U/L
ALT SERPL-CCNC: 26 U/L
ANION GAP SERPL CALC-SCNC: 11 MMOL/L
AST SERPL-CCNC: 32 U/L
BILIRUB SERPL-MCNC: 0.4 MG/DL
BUN SERPL-MCNC: 16 MG/DL
CALCIUM SERPL-MCNC: 9.3 MG/DL
CHLORIDE SERPL-SCNC: 101 MMOL/L
CHOLEST SERPL-MCNC: 181 MG/DL
CO2 SERPL-SCNC: 26 MMOL/L
CREAT SERPL-MCNC: 0.71 MG/DL
EGFR: 116 ML/MIN/1.73M2
ESTIMATED AVERAGE GLUCOSE: 105 MG/DL
GLUCOSE SERPL-MCNC: 89 MG/DL
HBA1C MFR BLD HPLC: 5.3 %
HCT VFR BLD CALC: 43.7 %
HCV AB SER QL: NONREACTIVE
HCV S/CO RATIO: 0.16 S/CO
HDLC SERPL-MCNC: 73 MG/DL
HGB BLD-MCNC: 14.3 G/DL
HIV1+2 AB SPEC QL IA.RAPID: NONREACTIVE
LDLC SERPL CALC-MCNC: 88 MG/DL
MCHC RBC-ENTMCNC: 30 PG
MCHC RBC-ENTMCNC: 32.7 GM/DL
MCV RBC AUTO: 91.6 FL
NONHDLC SERPL-MCNC: 108 MG/DL
PLATELET # BLD AUTO: 263 K/UL
POTASSIUM SERPL-SCNC: 4.5 MMOL/L
PROT SERPL-MCNC: 7.4 G/DL
RBC # BLD: 4.77 M/UL
RBC # FLD: 11.6 %
SODIUM SERPL-SCNC: 138 MMOL/L
T PALLIDUM AB SER QL IA: NEGATIVE
TRIGL SERPL-MCNC: 117 MG/DL
WBC # FLD AUTO: 9.38 K/UL

## 2024-05-14 ENCOUNTER — OUTPATIENT (OUTPATIENT)
Dept: OUTPATIENT SERVICES | Facility: HOSPITAL | Age: 44
LOS: 1 days | End: 2024-05-14

## 2024-05-14 ENCOUNTER — LABORATORY RESULT (OUTPATIENT)
Age: 44
End: 2024-05-14

## 2024-05-14 ENCOUNTER — APPOINTMENT (OUTPATIENT)
Age: 44
End: 2024-05-14
Payer: COMMERCIAL

## 2024-05-14 VITALS
WEIGHT: 210 LBS | DIASTOLIC BLOOD PRESSURE: 76 MMHG | OXYGEN SATURATION: 97 % | SYSTOLIC BLOOD PRESSURE: 115 MMHG | HEART RATE: 90 BPM | BODY MASS INDEX: 33.75 KG/M2 | HEIGHT: 66 IN | TEMPERATURE: 98.6 F

## 2024-05-14 DIAGNOSIS — Z11.3 ENCOUNTER FOR SCREENING FOR INFECTIONS WITH A PREDOMINANTLY SEXUAL MODE OF TRANSMISSION: ICD-10-CM

## 2024-05-14 DIAGNOSIS — K21.9 GASTRO-ESOPHAGEAL REFLUX DISEASE W/OUT ESOPHAGITIS: ICD-10-CM

## 2024-05-14 DIAGNOSIS — R74.8 ABNORMAL LEVELS OF OTHER SERUM ENZYMES: ICD-10-CM

## 2024-05-14 PROCEDURE — 99213 OFFICE O/P EST LOW 20 MIN: CPT | Mod: 25

## 2024-05-14 PROCEDURE — 99000 SPECIMEN HANDLING OFFICE-LAB: CPT

## 2024-05-14 NOTE — HISTORY OF PRESENT ILLNESS
[de-identified] : 43 y o m with no significant pmhx, comes in for his result. He has no complaints except that he would like a chlamydia test.

## 2024-05-14 NOTE — PHYSICAL EXAM
[No Acute Distress] : no acute distress [Well Nourished] : well nourished [Well-Appearing] : well-appearing [Speech Grossly Normal] : speech grossly normal [Memory Grossly Normal] : memory grossly normal [Normal Affect] : the affect was normal [Alert and Oriented x3] : oriented to person, place, and time [Normal Mood] : the mood was normal

## 2024-05-14 NOTE — REVIEW OF SYSTEMS
[Abdominal Pain] : no abdominal pain [Constipation] : no constipation [Diarrhea] : no diarrhea [Heartburn] : heartburn [Melena] : no melena [Negative] : Heme/Lymph

## 2024-05-14 NOTE — PLAN
[FreeTextEntry1] : 43 y o m with all normal labs except for elevated alkaline phosphatase. Will monitor. STI: chlamydia screening as per pt's request. GERD: trigger avoidance discussed. Healthy eating, exercise, safe sex. RTC on Friday for result.

## 2024-05-15 DIAGNOSIS — K21.9 GASTRO-ESOPHAGEAL REFLUX DISEASE WITHOUT ESOPHAGITIS: ICD-10-CM

## 2024-05-15 DIAGNOSIS — Z00.00 ENCOUNTER FOR GENERAL ADULT MEDICAL EXAMINATION WITHOUT ABNORMAL FINDINGS: ICD-10-CM

## 2024-05-15 DIAGNOSIS — R74.8 ABNORMAL LEVELS OF OTHER SERUM ENZYMES: ICD-10-CM

## 2024-05-15 DIAGNOSIS — R10.13 EPIGASTRIC PAIN: ICD-10-CM

## 2024-05-15 DIAGNOSIS — Z78.9 OTHER SPECIFIED HEALTH STATUS: ICD-10-CM

## 2024-05-15 DIAGNOSIS — Z80.49 FAMILY HISTORY OF MALIGNANT NEOPLASM OF OTHER GENITAL ORGANS: ICD-10-CM

## 2024-05-15 DIAGNOSIS — Z11.3 ENCOUNTER FOR SCREENING FOR INFECTIONS WITH A PREDOMINANTLY SEXUAL MODE OF TRANSMISSION: ICD-10-CM

## 2024-05-17 ENCOUNTER — APPOINTMENT (OUTPATIENT)
Age: 44
End: 2024-05-17
Payer: COMMERCIAL

## 2024-05-17 ENCOUNTER — OUTPATIENT (OUTPATIENT)
Dept: OUTPATIENT SERVICES | Facility: HOSPITAL | Age: 44
LOS: 1 days | End: 2024-05-17

## 2024-05-17 VITALS
SYSTOLIC BLOOD PRESSURE: 125 MMHG | DIASTOLIC BLOOD PRESSURE: 82 MMHG | OXYGEN SATURATION: 97 % | RESPIRATION RATE: 14 BRPM | TEMPERATURE: 98.5 F | WEIGHT: 214 LBS | HEART RATE: 79 BPM | BODY MASS INDEX: 34.54 KG/M2

## 2024-05-17 DIAGNOSIS — Z72.51 HIGH RISK HETEROSEXUAL BEHAVIOR: ICD-10-CM

## 2024-05-17 PROCEDURE — 99213 OFFICE O/P EST LOW 20 MIN: CPT

## 2024-05-17 NOTE — PHYSICAL EXAM
[No Acute Distress] : no acute distress [Well Nourished] : well nourished [Well Developed] : well developed [Speech Grossly Normal] : speech grossly normal [Memory Grossly Normal] : memory grossly normal [Normal Affect] : the affect was normal [Normal Mood] : the mood was normal

## 2024-05-17 NOTE — PLAN
[FreeTextEntry1] : 43 y o m with high-risk sexual behavior. CG/CT negative Safe sex counseling provided with condom use. Pt is to return when the rash recur.

## 2024-05-17 NOTE — HISTORY OF PRESENT ILLNESS
[de-identified] : 43 y o m comes in for his lab result. He c/o having a rash to his penis whenever he has too much sex. He has no rash at the present. Pt states he has unprotected sex with a young person every day.

## 2024-05-23 DIAGNOSIS — Z72.51 HIGH RISK HETEROSEXUAL BEHAVIOR: ICD-10-CM

## 2024-06-28 NOTE — DISCHARGE NOTE PROVIDER - HOSPITAL COURSE
A catheter was exchanged for a (CATHETER 5FR FL4 CRV 100CM 2 WIRE BRAID STIFF PROX SHAFT) catheter. 42 y/o male PMHx ETOH history, H. pylori c/o RUQ pain since this morning. US shows gallbladder sludge. No wall thickening or pericholecystic fluid. CT shows Acute very mild pancreatitis question in the head and uncinate process. Hepatomegaly and hepatic steatosis. Bilirubin increased 2.0 -> 2.9. MRCP showed no cholelithiasis, or choledocholithiasis, no ductal dilatation. Mild pancreatitis.   Patient's pain resolved and diet was advanced as tolerated.   Patient was seen by Hospitalist for ETOH withdrawal, placed on CIWA protocol and monitored. Cleared by medicine for discharge on Librium.  Patient discharged in stable condition as per Dr. Tellez. 40 y/o male PMHx ETOH history, H. pylori c/o RUQ pain since this morning. US shows gallbladder sludge. No wall thickening or pericholecystic fluid. CT shows Acute very mild pancreatitis question in the head and uncinate process. Hepatomegaly and hepatic steatosis. Bilirubin increased 2.0 -> 2.9. MRCP showed no cholelithiasis, or choledocholithiasis, no ductal dilatation. Mild pancreatitis.   Patient's pain resolved and diet was advanced as tolerated.   Patient was seen by Hospitalist for ETOH withdrawal, placed on CIWA protocol and monitored. Cleared by medicine for discharge on Librium Taper.   Patient discharged in stable condition as per Dr. Tellez.

## 2024-07-15 ENCOUNTER — APPOINTMENT (OUTPATIENT)
Dept: GASTROENTEROLOGY | Facility: CLINIC | Age: 44
End: 2024-07-15

## 2024-08-23 NOTE — ED PROVIDER NOTE - NS ED MD DISPO ISOLATION TYPES
Medications as prescribed.  Can take over-the-counter Zyrtec, Flonase and Mucinex for congestion.  Follow-up with family doctor in 3 to 5 days.  Increase fluid intake and diet as tolerated.  Can take over-the-counter Motrin or Tylenol as needed for pain.  Return for new or worsening symptoms.   None

## 2024-10-18 ENCOUNTER — INPATIENT (INPATIENT)
Facility: HOSPITAL | Age: 44
LOS: 2 days | Discharge: HOME | End: 2024-10-21
Attending: STUDENT IN AN ORGANIZED HEALTH CARE EDUCATION/TRAINING PROGRAM | Admitting: STUDENT IN AN ORGANIZED HEALTH CARE EDUCATION/TRAINING PROGRAM
Payer: SELF-PAY

## 2024-10-18 VITALS
HEIGHT: 65 IN | SYSTOLIC BLOOD PRESSURE: 159 MMHG | TEMPERATURE: 98 F | DIASTOLIC BLOOD PRESSURE: 96 MMHG | WEIGHT: 229.94 LBS | HEART RATE: 84 BPM | RESPIRATION RATE: 20 BRPM | OXYGEN SATURATION: 97 %

## 2024-10-18 DIAGNOSIS — F10.239 ALCOHOL DEPENDENCE WITH WITHDRAWAL, UNSPECIFIED: ICD-10-CM

## 2024-10-18 DIAGNOSIS — R74.01 ELEVATION OF LEVELS OF LIVER TRANSAMINASE LEVELS: ICD-10-CM

## 2024-10-18 DIAGNOSIS — E87.6 HYPOKALEMIA: ICD-10-CM

## 2024-10-18 DIAGNOSIS — R74.8 ABNORMAL LEVELS OF OTHER SERUM ENZYMES: ICD-10-CM

## 2024-10-18 LAB
ALBUMIN SERPL ELPH-MCNC: 3.8 G/DL — SIGNIFICANT CHANGE UP (ref 3.3–5)
ALP SERPL-CCNC: 154 U/L — HIGH (ref 40–120)
ALT FLD-CCNC: 160 U/L — HIGH (ref 12–78)
ANION GAP SERPL CALC-SCNC: 10 MMOL/L — SIGNIFICANT CHANGE UP (ref 5–17)
AST SERPL-CCNC: 247 U/L — HIGH (ref 15–37)
BASOPHILS # BLD AUTO: 0.07 K/UL — SIGNIFICANT CHANGE UP (ref 0–0.2)
BASOPHILS NFR BLD AUTO: 1.6 % — SIGNIFICANT CHANGE UP (ref 0–2)
BILIRUB SERPL-MCNC: 0.5 MG/DL — SIGNIFICANT CHANGE UP (ref 0.2–1.2)
BUN SERPL-MCNC: 5 MG/DL — LOW (ref 7–23)
CALCIUM SERPL-MCNC: 8.3 MG/DL — LOW (ref 8.5–10.1)
CHLORIDE SERPL-SCNC: 103 MMOL/L — SIGNIFICANT CHANGE UP (ref 96–108)
CO2 SERPL-SCNC: 28 MMOL/L — SIGNIFICANT CHANGE UP (ref 22–31)
CREAT SERPL-MCNC: 0.69 MG/DL — SIGNIFICANT CHANGE UP (ref 0.5–1.3)
EGFR: 116 ML/MIN/1.73M2 — SIGNIFICANT CHANGE UP
EOSINOPHIL # BLD AUTO: 0.05 K/UL — SIGNIFICANT CHANGE UP (ref 0–0.5)
EOSINOPHIL NFR BLD AUTO: 1.2 % — SIGNIFICANT CHANGE UP (ref 0–6)
ETHANOL SERPL-MCNC: 328 MG/DL — HIGH (ref 0–10)
GLUCOSE SERPL-MCNC: 106 MG/DL — HIGH (ref 70–99)
HCT VFR BLD CALC: 47.6 % — SIGNIFICANT CHANGE UP (ref 39–50)
HGB BLD-MCNC: 15.7 G/DL — SIGNIFICANT CHANGE UP (ref 13–17)
IMM GRANULOCYTES NFR BLD AUTO: 0.2 % — SIGNIFICANT CHANGE UP (ref 0–0.9)
LIDOCAIN IGE QN: 46 U/L — SIGNIFICANT CHANGE UP (ref 13–75)
LYMPHOCYTES # BLD AUTO: 1.99 K/UL — SIGNIFICANT CHANGE UP (ref 1–3.3)
LYMPHOCYTES # BLD AUTO: 46.3 % — HIGH (ref 13–44)
MCHC RBC-ENTMCNC: 29.6 PG — SIGNIFICANT CHANGE UP (ref 27–34)
MCHC RBC-ENTMCNC: 33 G/DL — SIGNIFICANT CHANGE UP (ref 32–36)
MCV RBC AUTO: 89.8 FL — SIGNIFICANT CHANGE UP (ref 80–100)
MONOCYTES # BLD AUTO: 0.32 K/UL — SIGNIFICANT CHANGE UP (ref 0–0.9)
MONOCYTES NFR BLD AUTO: 7.4 % — SIGNIFICANT CHANGE UP (ref 2–14)
NEUTROPHILS # BLD AUTO: 1.86 K/UL — SIGNIFICANT CHANGE UP (ref 1.8–7.4)
NEUTROPHILS NFR BLD AUTO: 43.3 % — SIGNIFICANT CHANGE UP (ref 43–77)
NRBC # BLD: 0 /100 WBCS — SIGNIFICANT CHANGE UP (ref 0–0)
PLATELET # BLD AUTO: 163 K/UL — SIGNIFICANT CHANGE UP (ref 150–400)
POTASSIUM SERPL-MCNC: 3.4 MMOL/L — LOW (ref 3.5–5.3)
POTASSIUM SERPL-SCNC: 3.4 MMOL/L — LOW (ref 3.5–5.3)
PROT SERPL-MCNC: 8.3 GM/DL — SIGNIFICANT CHANGE UP (ref 6–8.3)
RBC # BLD: 5.3 M/UL — SIGNIFICANT CHANGE UP (ref 4.2–5.8)
RBC # FLD: 13.8 % — SIGNIFICANT CHANGE UP (ref 10.3–14.5)
SODIUM SERPL-SCNC: 141 MMOL/L — SIGNIFICANT CHANGE UP (ref 135–145)
WBC # BLD: 4.3 K/UL — SIGNIFICANT CHANGE UP (ref 3.8–10.5)
WBC # FLD AUTO: 4.3 K/UL — SIGNIFICANT CHANGE UP (ref 3.8–10.5)

## 2024-10-18 PROCEDURE — 99222 1ST HOSP IP/OBS MODERATE 55: CPT

## 2024-10-18 PROCEDURE — 99285 EMERGENCY DEPT VISIT HI MDM: CPT

## 2024-10-18 RX ORDER — CHLORDIAZEPOXIDE HCL 10 MG
CAPSULE ORAL
Refills: 0 | Status: DISCONTINUED | OUTPATIENT
Start: 2024-10-20 | End: 2024-10-21

## 2024-10-18 RX ORDER — B-COMPLEX WITH VITAMIN C
1 VIAL (ML) INJECTION DAILY
Refills: 0 | Status: DISCONTINUED | OUTPATIENT
Start: 2024-10-18 | End: 2024-10-21

## 2024-10-18 RX ORDER — THIAMINE HCL 100 MG
100 TABLET ORAL DAILY
Refills: 0 | Status: COMPLETED | OUTPATIENT
Start: 2024-10-18 | End: 2024-10-21

## 2024-10-18 RX ORDER — PHENOBARBITAL 15 MG
130 TABLET ORAL ONCE
Refills: 0 | Status: DISCONTINUED | OUTPATIENT
Start: 2024-10-18 | End: 2024-10-18

## 2024-10-18 RX ORDER — MAGNESIUM, ALUMINUM HYDROXIDE 200-200 MG
30 TABLET,CHEWABLE ORAL EVERY 4 HOURS
Refills: 0 | Status: DISCONTINUED | OUTPATIENT
Start: 2024-10-18 | End: 2024-10-21

## 2024-10-18 RX ORDER — LORAZEPAM 2 MG
2 TABLET ORAL ONCE
Refills: 0 | Status: DISCONTINUED | OUTPATIENT
Start: 2024-10-18 | End: 2024-10-18

## 2024-10-18 RX ORDER — ENOXAPARIN SODIUM 40MG/0.4ML
40 SYRINGE (ML) SUBCUTANEOUS EVERY 12 HOURS
Refills: 0 | Status: DISCONTINUED | OUTPATIENT
Start: 2024-10-18 | End: 2024-10-19

## 2024-10-18 RX ORDER — ONDANSETRON HYDROCHLORIDE 2 MG/ML
4 INJECTION, SOLUTION INTRAMUSCULAR; INTRAVENOUS ONCE
Refills: 0 | Status: COMPLETED | OUTPATIENT
Start: 2024-10-18 | End: 2024-10-18

## 2024-10-18 RX ORDER — FAMOTIDINE 10 MG/ML
20 INJECTION INTRAVENOUS ONCE
Refills: 0 | Status: COMPLETED | OUTPATIENT
Start: 2024-10-18 | End: 2024-10-18

## 2024-10-18 RX ORDER — POTASSIUM CHLORIDE 10 MEQ
40 TABLET, EXTENDED RELEASE ORAL ONCE
Refills: 0 | Status: COMPLETED | OUTPATIENT
Start: 2024-10-18 | End: 2024-10-18

## 2024-10-18 RX ORDER — CHLORDIAZEPOXIDE HCL 10 MG
50 CAPSULE ORAL EVERY 12 HOURS
Refills: 0 | Status: COMPLETED | OUTPATIENT
Start: 2024-10-21 | End: 2024-10-21

## 2024-10-18 RX ORDER — CHLORDIAZEPOXIDE HCL 10 MG
50 CAPSULE ORAL EVERY 6 HOURS
Refills: 0 | Status: DISCONTINUED | OUTPATIENT
Start: 2024-10-18 | End: 2024-10-19

## 2024-10-18 RX ORDER — ONDANSETRON HYDROCHLORIDE 2 MG/ML
4 INJECTION, SOLUTION INTRAMUSCULAR; INTRAVENOUS EVERY 8 HOURS
Refills: 0 | Status: DISCONTINUED | OUTPATIENT
Start: 2024-10-18 | End: 2024-10-21

## 2024-10-18 RX ORDER — SODIUM CHLORIDE 9 MG/ML
1000 INJECTION, SOLUTION INTRAMUSCULAR; INTRAVENOUS; SUBCUTANEOUS ONCE
Refills: 0 | Status: COMPLETED | OUTPATIENT
Start: 2024-10-18 | End: 2024-10-18

## 2024-10-18 RX ORDER — ACETAMINOPHEN 500 MG
650 TABLET ORAL EVERY 6 HOURS
Refills: 0 | Status: DISCONTINUED | OUTPATIENT
Start: 2024-10-18 | End: 2024-10-21

## 2024-10-18 RX ORDER — MELATONIN 5 MG
3 TABLET ORAL AT BEDTIME
Refills: 0 | Status: DISCONTINUED | OUTPATIENT
Start: 2024-10-18 | End: 2024-10-21

## 2024-10-18 RX ORDER — CHLORDIAZEPOXIDE HCL 10 MG
50 CAPSULE ORAL EVERY 8 HOURS
Refills: 0 | Status: DISCONTINUED | OUTPATIENT
Start: 2024-10-19 | End: 2024-10-20

## 2024-10-18 RX ORDER — MAGNESIUM OXIDE 400 MG/1
800 TABLET ORAL ONCE
Refills: 0 | Status: COMPLETED | OUTPATIENT
Start: 2024-10-18 | End: 2024-10-18

## 2024-10-18 RX ORDER — CHLORDIAZEPOXIDE HCL 10 MG
50 CAPSULE ORAL EVERY 24 HOURS
Refills: 0 | Status: DISCONTINUED | OUTPATIENT
Start: 2024-10-22 | End: 2024-10-21

## 2024-10-18 RX ORDER — FOLIC ACID 1 MG/1
1 TABLET ORAL DAILY
Refills: 0 | Status: DISCONTINUED | OUTPATIENT
Start: 2024-10-18 | End: 2024-10-21

## 2024-10-18 RX ADMIN — Medication 130 MILLIGRAM(S): at 20:34

## 2024-10-18 RX ADMIN — Medication 40 MILLIEQUIVALENT(S): at 20:24

## 2024-10-18 RX ADMIN — ONDANSETRON HYDROCHLORIDE 4 MILLIGRAM(S): 2 INJECTION, SOLUTION INTRAMUSCULAR; INTRAVENOUS at 16:12

## 2024-10-18 RX ADMIN — MAGNESIUM OXIDE 800 MILLIGRAM(S): 400 TABLET ORAL at 20:42

## 2024-10-18 RX ADMIN — Medication 50 MILLIGRAM(S): at 20:33

## 2024-10-18 RX ADMIN — Medication 2 MILLIGRAM(S): at 16:12

## 2024-10-18 RX ADMIN — SODIUM CHLORIDE 1000 MILLILITER(S): 9 INJECTION, SOLUTION INTRAMUSCULAR; INTRAVENOUS; SUBCUTANEOUS at 16:10

## 2024-10-18 RX ADMIN — FAMOTIDINE 20 MILLIGRAM(S): 10 INJECTION INTRAVENOUS at 16:10

## 2024-10-18 RX ADMIN — Medication 100 MILLIGRAM(S): at 20:34

## 2024-10-18 NOTE — ED ADULT NURSE NOTE - INTERPRETER'S NAME
Come back to our lab M-F, at 8am for fasting blood work. No apt needed, no food for 8 hours. Water and medication are okay.  
Ada

## 2024-10-18 NOTE — ED ADULT TRIAGE NOTE - CHIEF COMPLAINT QUOTE
Pt stated for the past 14 days he has been drinking alcohol  , and has not eating anything, and would like to get detox, and get iv fluids. Pt stated for the past 14 days he has been drinking alcohol , and has not eaten  anything, and would like to get detox, and  IV fluids.

## 2024-10-18 NOTE — ED PROVIDER NOTE - PROGRESS NOTE DETAILS
Patient was signed out to me pending evaluation by [facility]IRT.  SBIRT was unable to evaluate this patient.  He did have constantly elevated CIWA scores requiring multiple rounds of medication.  Concern today for alcohol withdrawal.  At this time, we believe that he would benefit from admission to the hospital for further workup and management.  I discussed this plan with the hospitalist service who accepted admission for the patient.

## 2024-10-18 NOTE — H&P ADULT - ASSESSMENT
Vinicius Santos is a 44 year old male with PMHx of EtOH abuse who presented to the ED on 10/18/24 for help with detoxification and admitted for alcohol dependence with withdrawal.    Alcohol dependence with withdrawal  Reports he has been drinking two gallons of Bacardi rum and whiskey daily for the past two weeks  States he has not eaten any food or drank anything besides alcohol in this time frame, last drank one bottle of Bacardi this morning  Blood alcohol level 328 on admission  S/p 1L NS bolus, famotidine 20 mg IV, ondansetron 4 mg IV, and lorazepam 2 mg IV in the ED  Phenobarbital 130 mg x 1 ordered  [facility] protocol, librium taper  Thiamine 100 mg, folic acid 1 mg, multivitamins started  Interested in abstinence from alcohol  Telemetry    Elevated alkaline phosphatase  Transaminitis  Alkphos 154, ,  on admission  R factor 3.1 which suggests mixed injury  Avoid hepatotoxic agents  F/u hepatitis panel, anti-smooth muscle Ab, abdominal U/S  Monitor LFTs    Hypokalemia  Potassium 3.4 on admission  KCl 40 mEq ordered, empirically ordered magox 800 mg  F/u repeat K and Mag in AM

## 2024-10-18 NOTE — ED PROVIDER NOTE - CLINICAL SUMMARY MEDICAL DECISION MAKING FREE TEXT BOX
45yo male with pmh etoh abuse presenting with poor po intake and requesting assistance with alcohol cessation.  Patient reports for past 14 days has been daily drinking "gallons" of liquor.  Today drank 1L of bacardi around 6am.  Has not been eating anything otherwise over this time.  + Nausea and anxiousness when he doesn't drink. Currently feeling it.  + Epigastrium ttp.  Mild withdrawal symptoms at time of my eval.  Will get screening labs and medicate.  Check electrolytes, robert.  Reassess for dispo, possible admission for etoh withdrawal vs referral for detox pending reassessment.

## 2024-10-18 NOTE — H&P ADULT - NSHPPHYSICALEXAM_GEN_ALL_CORE
T(C): 36.9 (10-18-24 @ 22:26), Max: 36.9 (10-18-24 @ 22:26)  HR: 84 (10-18-24 @ 22:26) (66 - 95)  BP: 144/82 (10-18-24 @ 22:26) (130/84 - 159/96)  RR: 18 (10-18-24 @ 22:26) (18 - 20)  SpO2: 94% (10-18-24 @ 22:26) (94% - 97%)    CONSTITUTIONAL: Well groomed  EYES: PERRLA and symmetric, EOMI  ENMT: Oral mucosa with moist membranes  RESP: No respiratory distress, no use of accessory muscles; CTA b/l  CV: tachycardic, regular rhythm  GI: Soft, NT, ND  NEURO: b/l arm and leg tremors noted

## 2024-10-18 NOTE — H&P ADULT - TIME BILLING
coordination of care with ER physician and ER RN, obtaining history, performing a physical examination, reviewing and interpreting labs and imaging, ordering further studies and tests, explaining the diagnosis and treatment plan to patient, and documentation as above.

## 2024-10-18 NOTE — ED ADULT NURSE NOTE - CHIEF COMPLAINT QUOTE
Pt stated for the past 14 days he has been drinking alcohol , and has not eaten  anything, and would like to get detox, and  IV fluids.

## 2024-10-18 NOTE — H&P ADULT - NSHPLABSRESULTS_GEN_ALL_CORE
15.7   4.30  )-----------( 163      ( 18 Oct 2024 16:04 )             47.6     141  |  103  |  5[L]  ----------------------------<  106[H]     10-18  3.4[L]   |  28  |  0.69    Ca    8.3[L]      18 Oct 2024 16:04    TPro  8.3  /  Alb  3.8  /  TBili  0.5  /  DBili  x   /  AST  247[H]  /  ALT  160[H]  /  AlkPhos  154[H]  10-18

## 2024-10-18 NOTE — ED ADULT NURSE NOTE - OBJECTIVE STATEMENT
Pt AOx2, disoriented to date/time, responsive with clear speech, ambulatory at baseline. Pt stated he asked his friend to bring him to the hospital today for difficulty ambulating and "tired of drinking alcohol." and wants "detox" and "help." Pt states he drank a bottle of bacardi this morning, states he has been drinking daily for the past 8 months. States he lives at home and has no family. Pt tearful but denies SI or HI. denies NKDA. denies PMH. CIWA completed. Pt tender to epigastric area. Denies CP/SOB/difficulty breathing, fever/chills, v/d,  symptoms.  [id number]  Pt AOx2, disoriented to date/time, responsive with clear speech, ambulatory at baseline. Pt stated he asked his friend to bring him to the hospital today for difficulty ambulating and "tired of drinking alcohol." and wants "detox" and "help." Pt states he drank a bottle of bacardi this morning, states he has been drinking daily for the past 8 months. States he lives at home and has no family. Pt tearful but denies SI or HI. denies NKDA. denies PMH. CIWA completed. Pt tender to epigastric area. Denies CP/SOB/difficulty breathing, fever/chills, v/d,  symptoms.

## 2024-10-18 NOTE — ED PROVIDER NOTE - PHYSICAL EXAMINATION
I, Taniya Grant, DNP, APNP, FNP-BC, personally performed the services described in documentation for this encounter, as scribed by Amy Bohler in my presence, and it is both accurate and complete.   General appearance: Nontoxic appearing, conversant, afebrile    Eyes: anicteric sclerae, ROXY, EOMI   HENT: Atraumatic; oropharynx clear, MMM and no ulcerations, no pharyngeal erythema or exudate   Neck: Trachea midline; Full range of motion, supple   Pulm: CTA bl, normal respiratory effort and no intercostal retractions, normal work of breathing   CV: RRR, No murmurs, rubs, or gallops   Abdomen: Soft, epigastrium tender, non-distended; no guarding or rebound   Extremities: No peripheral edema, no gross deformities, FROM x4, 5/5 MS x4, gross sensation intact, mild b/l ue tremulousness    Skin: Dry, normal temperature, turgor and texture; no rash   Psych: Appropriate affect, cooperative

## 2024-10-18 NOTE — PATIENT PROFILE ADULT - FALL HARM RISK - HARM RISK INTERVENTIONS
Communicate Risk of Fall with Harm to all staff/Reinforce activity limits and safety measures with patient and family/Review medications for side effects contributing to fall risk/Tailored Fall Risk Interventions/Visual Cue: Yellow wristband and red socks/Bed in lowest position, wheels locked, appropriate side rails in place/Call bell, personal items and telephone in reach/Instruct patient to call for assistance before getting out of bed or chair/Non-slip footwear when patient is out of bed/[place] to call system/Physically safe environment - no spills, clutter or unnecessary equipment/Purposeful Proactive Rounding/Room/bathroom lighting operational, light cord in reach

## 2024-10-18 NOTE — ED ADULT NURSE NOTE - NSFALLHARMRISKINTERV_ED_ALL_ED
Assistance OOB with selected safe patient handling equipment if applicable/Assistance with ambulation/Communicate risk of Fall with Harm to all staff, patient, and family/Monitor gait and stability/Monitor for mental status changes and reorient to person, place, and time, as needed/Provide visual cue: red socks, yellow wristband, yellow gown, etc/Reinforce activity limits and safety measures with patient and family/Toileting schedule using arm’s reach rule for commode and bathroom/Use of alarms - bed, stretcher, chair and/or video monitoring/Bed in lowest position, wheels locked, appropriate side rails in place/Call bell, personal items and telephone in reach/Instruct patient to call for assistance before getting out of bed/chair/stretcher/Non-slip footwear applied when patient is off stretcher/[place] to call system/Physically safe environment - no spills, clutter or unnecessary equipment/Purposeful Proactive Rounding/Room/bathroom lighting operational, light cord in reach

## 2024-10-18 NOTE — PATIENT PROFILE ADULT - FUNCTIONAL ASSESSMENT - DAILY ACTIVITY 5.
Patient: Zehra Mijares    Procedure Summary     Date: 08/09/22 Room / Location:  OR  / SURGERY [facility]    Anesthesia Start: 0712 Anesthesia Stop: 0829    Procedure: RIGHT ARTHROPLASTY, HIP, TOTAL, ANTERIOR APPROACH (Right Hip) Diagnosis:       Primary osteoarthritis of right hip      (Primary osteoarthritis of right hip [M16.11])    Surgeons: Dwight Lawson M.D. Responsible Provider: Zi Hardy M.D.    Anesthesia Type: general ASA Status: 3          Final Anesthesia Type: general  Last vitals  BP   Blood Pressure : (!) 145/64    Temp   35.9 °C (96.7 °F)    Pulse   (!) 57   Resp   16    SpO2   95 %      Anesthesia Post Evaluation    Patient location during evaluation: PACU  Patient participation: complete - patient participated  Level of consciousness: awake and alert    Airway patency: patent  Anesthetic complications: no  Cardiovascular status: hemodynamically stable  Respiratory status: acceptable  Hydration status: euvolemic    PONV: none          No complications documented.     Nurse Pain Score: 5 (NPRS)        
4 = No assist / stand by assistance

## 2024-10-18 NOTE — PATIENT PROFILE ADULT - FALL HARM RISK - FALL HARM RISK
History & Physical    SUBJECTIVE:     History of Present Illness:  62-year-old  female presents the clinic for evaluation for gallstones and hiatal hernia.  Patient is scheduled to have some procedures done on her spine at the pain center in the next few days.  Patient was seeing her primary care provider for increased abdominal pain and reflux.  CT scan the abdomen pelvis was performed which showed some benign-appearing scattered hepatic cysts throughout the liver with no solid suspicious mass; cholelithiasis without evidence of cholecystitis; small hiatal hernia; fat containing Bochdalek hernia on the left side; mild-to-moderate compression fractures at T12 through L2, suspected be chronic in nature.  Patient states that she does have pretty significant reflux and complain she gets increased pressure and bloatedness whenever she eats; also complains of epigastric pain a proximally 1 hour after she eats.  She states she has these symptoms whenever she eats any food; no particular foods cause different symptoms.  Patient also states that she has diarrhea whenever she eats.  Denies any significant reflux whenever she lays down at night or lays supine.  Patient does have a past medical history of diverticulitis requiring sigmoid resection in 2020, along with a diverting ileostomy and eventual reversal; last colonoscopy was 10 years prior to that in 2010; she has not had a colonoscopy since then or around the time of her reversal.  Patient has been diagnosed with COPD/emphysema, hypertension, aortic atherosclerosis, osteoporosis.  Currently, Denies any chest pain/shortness of breath, nausea/vomiting/diarrhea, fever/chills.    4/30:  Patient presents for evaluation.  Patient recently had an EGD which showed some grade a esophagitis and some gastritis, no hiatal hernia visualized.  Patient has been taken Prilosec 40 mg and states she does feel better.  Denies any chest pain or shortness of breath, fevers or  chills.    Chief Complaint   Patient presents with    Follow-up     GB and hiatal hernia        Review of patient's allergies indicates:   Allergen Reactions    Keflex [cephalexin] Itching and Rash       Current Outpatient Medications   Medication Sig Dispense Refill    albuterol (PROVENTIL) 2.5 mg /3 mL (0.083 %) nebulizer solution TAKE 3 MLS (2.5 MG TOTAL) BY NEBULIZATION EVERY 6 (SIX) HOURS AS NEEDED FOR WHEEZING OR SHORTNESS OF BREATH. RESCUE 90 mL 0    atorvastatin (LIPITOR) 40 MG tablet Take 1 tablet (40 mg total) by mouth once daily. 90 tablet 3    busPIRone (BUSPAR) 5 MG Tab Take 1 tablet (5 mg total) by mouth 2 (two) times daily. 180 tablet 1    denosumab (PROLIA) 60 mg/mL Syrg Inject 1 mL (60 mg total) into the skin every 6 (six) months. 2 mL 1    ergocalciferol (ERGOCALCIFEROL) 50,000 unit Cap Take 1 capsule (50,000 Units total) by mouth every 7 days. 15 capsule 1    EScitalopram oxalate (LEXAPRO) 20 MG tablet Take 1 tablet (20 mg total) by mouth once daily. 90 tablet 3    folic acid (FOLVITE) 1 MG tablet Take 1 tablet (1 mg total) by mouth once daily. 90 tablet 1    gabapentin (NEURONTIN) 100 MG capsule Take 2 capsules (200 mg total) by mouth 3 (three) times daily. 180 capsule 2    losartan (COZAAR) 25 MG tablet Take 1 tablet (25 mg total) by mouth once daily. 90 tablet 3    omeprazole (PRILOSEC) 40 MG capsule Take 1 capsule (40 mg total) by mouth once daily. 90 capsule 3     No current facility-administered medications for this visit.       Past Medical History:   Diagnosis Date    Depression     Neuropathy     Osteoporosis      Past Surgical History:   Procedure Laterality Date     SECTION      x2    ILEOSTOMY      LIVER SURGERY       Family History   Problem Relation Name Age of Onset    Cancer Mother      Diverticulitis Mother      Hypertension Father      Cancer Father      Cancer Sister      Ulcerative colitis Brother       Social History     Tobacco Use    Smoking status: Every Day      Current packs/day: 0.50     Average packs/day: 0.5 packs/day for 40.0 years (20.0 ttl pk-yrs)     Types: Cigarettes     Passive exposure: Current    Smokeless tobacco: Never    Tobacco comments:     Quit during years of children. Off & on since early 20s   Substance Use Topics    Alcohol use: Not Currently     Comment: Quit 1 year ago    Drug use: Yes     Types: Marijuana        Review of Systems:  Review of Systems   Constitutional: Negative.  Negative for fatigue and unexpected weight change.   HENT: Negative.  Negative for trouble swallowing.    Eyes: Negative.    Respiratory: Negative.  Negative for chest tightness and shortness of breath.    Cardiovascular: Negative.  Negative for chest pain.   Gastrointestinal:  Positive for abdominal pain, diarrhea and nausea. Negative for blood in stool and vomiting.   Endocrine: Negative.    Genitourinary: Negative.  Negative for hematuria.   Musculoskeletal: Negative.  Negative for back pain and myalgias.   Neurological: Negative.  Negative for dizziness, speech difficulty, weakness and light-headedness.   Psychiatric/Behavioral: Negative.  Negative for agitation and behavioral problems.        OBJECTIVE:     Vital Signs (Most Recent)              Physical Exam:  Physical Exam  Constitutional:       General: She is not in acute distress.     Appearance: Normal appearance.   HENT:      Head: Normocephalic.   Cardiovascular:      Rate and Rhythm: Normal rate.   Pulmonary:      Effort: Pulmonary effort is normal. No respiratory distress.   Abdominal:      General: There is no distension.      Tenderness: There is abdominal tenderness in the epigastric area.          Comments: Well-healed ileostomy site, lower abdominal incisions from sigmoid resection and    Musculoskeletal:         General: Normal range of motion.   Skin:     General: Skin is warm.      Coloration: Skin is not jaundiced.   Neurological:      General: No focal deficit present.      Mental Status: She  is alert and oriented to person, place, and time.      Cranial Nerves: No cranial nerve deficit.         ASSESSMENT/PLAN:     Small hiatal hernia, cholelithiasis, left Bochdalek hernia    PLAN:Plan     The patient has not followed up with Dr. Sullivan for cardiac clearance yet being that no hiatal hernia was seen on EGD.      We will get an upper GI with esophagram to look at the anatomy ensure no hiatal hernia; patient also has a HIDA scan ordered to evaluate the gallbladder.  If all comes back negative, no surgery needed and patient can continue taking her Prilosec and follow gastroenterology.  We will contact the patient once we have the results from the studies.          Other

## 2024-10-18 NOTE — H&P ADULT - HISTORY OF PRESENT ILLNESS
Vinicius Santos is a 44 year old male with PMHx of EtOH abuse who presented to the ED on 10/18/24 for help with detoxification.    Regis ID #[id number] utilized. Patient reports he has been drinking two gallons of Bacardi rum and whiskey daily for the past two weeks. States he has not eaten any food or drank anything besides alcohol in this time frame. Last drank one bottle of Bacardi this morning. Started drinking due to "life problems" but does not wish to elaborate further. Became tearful during conversation. Came to the ER for assistance in quitting drinking.    In the ED, VSS except BP as elevated as 159/96. CBC unremarkable. Potassium 3.4, alkphos 154, , . Blood alcohol level 328. Received 1L NS bolus, famotidine 20 mg IV, ondansetron 4 mg IV, and lorazepam 2 mg IV.

## 2024-10-18 NOTE — H&P ADULT - MLM HIDDEN
[facility]    Progress Note - Rita'Arnold.    DO [facility]  Wadena yes

## 2024-10-19 LAB
ALBUMIN SERPL ELPH-MCNC: 3.9 G/DL — SIGNIFICANT CHANGE UP (ref 3.3–5)
ALP SERPL-CCNC: 150 U/L — HIGH (ref 40–120)
ALT FLD-CCNC: 149 U/L — HIGH (ref 12–78)
ANION GAP SERPL CALC-SCNC: 11 MMOL/L — SIGNIFICANT CHANGE UP (ref 5–17)
AST SERPL-CCNC: 213 U/L — HIGH (ref 15–37)
BILIRUB DIRECT SERPL-MCNC: 0.3 MG/DL — SIGNIFICANT CHANGE UP (ref 0–0.3)
BILIRUB INDIRECT FLD-MCNC: 0.6 MG/DL — SIGNIFICANT CHANGE UP (ref 0.2–1)
BILIRUB SERPL-MCNC: 0.9 MG/DL — SIGNIFICANT CHANGE UP (ref 0.2–1.2)
BUN SERPL-MCNC: 8 MG/DL — SIGNIFICANT CHANGE UP (ref 7–23)
CALCIUM SERPL-MCNC: 8.7 MG/DL — SIGNIFICANT CHANGE UP (ref 8.5–10.1)
CHLORIDE SERPL-SCNC: 101 MMOL/L — SIGNIFICANT CHANGE UP (ref 96–108)
CO2 SERPL-SCNC: 27 MMOL/L — SIGNIFICANT CHANGE UP (ref 22–31)
CREAT SERPL-MCNC: 0.76 MG/DL — SIGNIFICANT CHANGE UP (ref 0.5–1.3)
EGFR: 113 ML/MIN/1.73M2 — SIGNIFICANT CHANGE UP
GLUCOSE SERPL-MCNC: 87 MG/DL — SIGNIFICANT CHANGE UP (ref 70–99)
MAGNESIUM SERPL-MCNC: 1.9 MG/DL — SIGNIFICANT CHANGE UP (ref 1.6–2.6)
POTASSIUM SERPL-MCNC: 3.5 MMOL/L — SIGNIFICANT CHANGE UP (ref 3.5–5.3)
POTASSIUM SERPL-SCNC: 3.5 MMOL/L — SIGNIFICANT CHANGE UP (ref 3.5–5.3)
PROT SERPL-MCNC: 8.3 GM/DL — SIGNIFICANT CHANGE UP (ref 6–8.3)
SODIUM SERPL-SCNC: 139 MMOL/L — SIGNIFICANT CHANGE UP (ref 135–145)

## 2024-10-19 PROCEDURE — 76700 US EXAM ABDOM COMPLETE: CPT | Mod: 26

## 2024-10-19 PROCEDURE — 99232 SBSQ HOSP IP/OBS MODERATE 35: CPT

## 2024-10-19 RX ORDER — LORAZEPAM 2 MG
1 TABLET ORAL EVERY 4 HOURS
Refills: 0 | Status: DISCONTINUED | OUTPATIENT
Start: 2024-10-19 | End: 2024-10-21

## 2024-10-19 RX ORDER — ENOXAPARIN SODIUM 40MG/0.4ML
40 SYRINGE (ML) SUBCUTANEOUS EVERY 24 HOURS
Refills: 0 | Status: DISCONTINUED | OUTPATIENT
Start: 2024-10-20 | End: 2024-10-21

## 2024-10-19 RX ADMIN — Medication 1 MILLIGRAM(S): at 07:53

## 2024-10-19 RX ADMIN — Medication 50 MILLIGRAM(S): at 21:02

## 2024-10-19 RX ADMIN — Medication 1 TABLET(S): at 11:52

## 2024-10-19 RX ADMIN — Medication 1 MILLIGRAM(S): at 21:10

## 2024-10-19 RX ADMIN — ONDANSETRON HYDROCHLORIDE 4 MILLIGRAM(S): 2 INJECTION, SOLUTION INTRAMUSCULAR; INTRAVENOUS at 07:53

## 2024-10-19 RX ADMIN — Medication 50 MILLIGRAM(S): at 00:25

## 2024-10-19 RX ADMIN — Medication 50 MILLIGRAM(S): at 11:52

## 2024-10-19 RX ADMIN — Medication 100 MILLIGRAM(S): at 11:52

## 2024-10-19 RX ADMIN — Medication 1 MILLIGRAM(S): at 17:13

## 2024-10-19 RX ADMIN — FOLIC ACID 1 MILLIGRAM(S): 1 TABLET ORAL at 11:52

## 2024-10-19 RX ADMIN — Medication 50 MILLIGRAM(S): at 05:41

## 2024-10-19 RX ADMIN — Medication 40 MILLIGRAM(S): at 05:41

## 2024-10-19 NOTE — PROGRESS NOTE ADULT - SUBJECTIVE AND OBJECTIVE BOX
INTERVAL HPI/OVERNIGHT EVENTS:  Pt seen and examined at bedside.     Allergies/Intolerance: No Known Allergies      MEDICATIONS  (STANDING):  chlordiazePOXIDE 50 milliGRAM(s) Oral every 6 hours  chlordiazePOXIDE 50 milliGRAM(s) Oral every 8 hours  chlordiazePOXIDE   Oral   enoxaparin Injectable 40 milliGRAM(s) SubCutaneous every 12 hours  folic acid 1 milliGRAM(s) Oral daily  multivitamin 1 Tablet(s) Oral daily  thiamine 100 milliGRAM(s) Oral daily    MEDICATIONS  (PRN):  acetaminophen     Tablet .. 650 milliGRAM(s) Oral every 6 hours PRN Temp greater or equal to 38C (100.4F), Mild Pain (1 - 3)  aluminum hydroxide/magnesium hydroxide/simethicone Suspension 30 milliLiter(s) Oral every 4 hours PRN Dyspepsia  LORazepam   Injectable 1 milliGRAM(s) IV Push every 4 hours PRN CIWA-Ar score 8 or greater  melatonin 3 milliGRAM(s) Oral at bedtime PRN Insomnia  ondansetron Injectable 4 milliGRAM(s) IV Push every 8 hours PRN Nausea and/or Vomiting        ROS: all systems reviewed and wnl      PHYSICAL EXAMINATION:  Vital Signs Last 24 Hrs  T(C): 37.2 (19 Oct 2024 10:10), Max: 37.2 (18 Oct 2024 23:49)  T(F): 99 (19 Oct 2024 10:10), Max: 99 (19 Oct 2024 10:10)  HR: 91 (19 Oct 2024 10:10) (66 - 99)  BP: 154/104 (19 Oct 2024 10:10) (130/84 - 160/90)  BP(mean): 103 (18 Oct 2024 22:26) (103 - 103)  RR: 18 (19 Oct 2024 10:10) (18 - 20)  SpO2: 95% (19 Oct 2024 10:10) (93% - 97%)    Parameters below as of 19 Oct 2024 10:10  Patient On (Oxygen Delivery Method): room air      CAPILLARY BLOOD GLUCOSE      POCT Blood Glucose.: 118 mg/dL (18 Oct 2024 12:22)      10-18 @ 07:01  -  10-19 @ 07:00  --------------------------------------------------------  IN: 0 mL / OUT: 800 mL / NET: -800 mL    10-19 @ 07:01  -  10-19 @ 11:44  --------------------------------------------------------  IN: 0 mL / OUT: 500 mL / NET: -500 mL        GENERAL:   NECK: supple, No JVD  CHEST/LUNG: clear to auscultation bilaterally; no rales, rhonchi, or wheezing b/l  HEART: normal S1, S2  ABDOMEN: BS+, soft, ND, NT   EXTREMITIES:  pulses palpable; no clubbing, cyanosis, or edema b/l LEs      LABS:                        15.7   4.30  )-----------( 163      ( 18 Oct 2024 16:04 )             47.6     10-19    139  |  101  |  8   ----------------------------<  87  3.5   |  27  |  0.76    Ca    8.7      19 Oct 2024 07:00  Mg     1.9     10-19    TPro  8.3  /  Alb  3.9  /  TBili  0.9  /  DBili  0.3  /  AST  213[H]  /  ALT  149[H]  /  AlkPhos  150[H]  10-19      Urinalysis Basic - ( 19 Oct 2024 07:00 )    Color: x / Appearance: x / SG: x / pH: x  Gluc: 87 mg/dL / Ketone: x  / Bili: x / Urobili: x   Blood: x / Protein: x / Nitrite: x   Leuk Esterase: x / RBC: x / WBC x   Sq Epi: x / Non Sq Epi: x / Bacteria: x             INTERVAL HPI/OVERNIGHT EVENTS:  Pt seen and examined at bedside.     Allergies/Intolerance: No Known Allergies      MEDICATIONS  (STANDING):  chlordiazePOXIDE 50 milliGRAM(s) Oral every 6 hours  chlordiazePOXIDE 50 milliGRAM(s) Oral every 8 hours  chlordiazePOXIDE   Oral   enoxaparin Injectable 40 milliGRAM(s) SubCutaneous every 12 hours  folic acid 1 milliGRAM(s) Oral daily  multivitamin 1 Tablet(s) Oral daily  thiamine 100 milliGRAM(s) Oral daily    MEDICATIONS  (PRN):  acetaminophen     Tablet .. 650 milliGRAM(s) Oral every 6 hours PRN Temp greater or equal to 38C (100.4F), Mild Pain (1 - 3)  aluminum hydroxide/magnesium hydroxide/simethicone Suspension 30 milliLiter(s) Oral every 4 hours PRN Dyspepsia  LORazepam   Injectable 1 milliGRAM(s) IV Push every 4 hours PRN CIWA-Ar score 8 or greater  melatonin 3 milliGRAM(s) Oral at bedtime PRN Insomnia  ondansetron Injectable 4 milliGRAM(s) IV Push every 8 hours PRN Nausea and/or Vomiting        ROS: all systems reviewed and wnl      PHYSICAL EXAMINATION:  Vital Signs Last 24 Hrs  T(C): 37.2 (19 Oct 2024 10:10), Max: 37.2 (18 Oct 2024 23:49)  T(F): 99 (19 Oct 2024 10:10), Max: 99 (19 Oct 2024 10:10)  HR: 91 (19 Oct 2024 10:10) (66 - 99)  BP: 154/104 (19 Oct 2024 10:10) (130/84 - 160/90)  BP(mean): 103 (18 Oct 2024 22:26) (103 - 103)  RR: 18 (19 Oct 2024 10:10) (18 - 20)  SpO2: 95% (19 Oct 2024 10:10) (93% - 97%)    Parameters below as of 19 Oct 2024 10:10  Patient On (Oxygen Delivery Method): room air      CAPILLARY BLOOD GLUCOSE      POCT Blood Glucose.: 118 mg/dL (18 Oct 2024 12:22)      10-18 @ 07:01  -  10-19 @ 07:00  --------------------------------------------------------  IN: 0 mL / OUT: 800 mL / NET: -800 mL    10-19 @ 07:01  -  10-19 @ 11:44  --------------------------------------------------------  IN: 0 mL / OUT: 500 mL / NET: -500 mL        GENERAL: stable, in bed, mood controlled, no anxiety  NECK: supple, No JVD  CHEST/LUNG: clear to auscultation bilaterally; no rales, rhonchi, or wheezing b/l  HEART: normal S1, S2  ABDOMEN: BS+, soft, ND, NT   EXTREMITIES:  pulses palpable; no clubbing, cyanosis, or edema b/l LEs      LABS:                        15.7   4.30  )-----------( 163      ( 18 Oct 2024 16:04 )             47.6     10-19    139  |  101  |  8   ----------------------------<  87  3.5   |  27  |  0.76    Ca    8.7      19 Oct 2024 07:00  Mg     1.9     10-19    TPro  8.3  /  Alb  3.9  /  TBili  0.9  /  DBili  0.3  /  AST  213[H]  /  ALT  149[H]  /  AlkPhos  150[H]  10-19      Urinalysis Basic - ( 19 Oct 2024 07:00 )    Color: x / Appearance: x / SG: x / pH: x  Gluc: 87 mg/dL / Ketone: x  / Bili: x / Urobili: x   Blood: x / Protein: x / Nitrite: x   Leuk Esterase: x / RBC: x / WBC x   Sq Epi: x / Non Sq Epi: x / Bacteria: x

## 2024-10-19 NOTE — PROGRESS NOTE ADULT - ASSESSMENT
Vinicius Santos is a 44 year old male with PMHx of EtOH abuse who presented to the ED on 10/18/24 for help with detoxification and admitted for alcohol dependence with withdrawal.    Alcohol dependence with withdrawal  Reports he has been drinking two gallons of Bacardi rum and whiskey daily for the past two weeks  States he has not eaten any food or drank anything besides alcohol in this time frame, last drank one bottle of Bacardi this morning  Blood alcohol level 328 on admission  S/p 1L NS bolus, famotidine 20 mg IV, ondansetron 4 mg IV, and lorazepam 2 mg IV in the ED  Phenobarbital 130 mg x 1 ordered  [facility] protocol, librium taper  Thiamine 100 mg, folic acid 1 mg, multivitamins started  Interested in abstinence from alcohol  Telemetry    Elevated alkaline phosphatase  Transaminitis  Alkphos 154, ,  on admission  R factor 3.1 which suggests mixed injury  Avoid hepatotoxic agents  F/u hepatitis panel, anti-smooth muscle Ab, abdominal U/S  Monitor LFTs    Hypokalemia  Potassium 3.4 on admission  KCl 40 mEq ordered, empirically ordered magox 800 mg  F/u repeat K and Mag in AM Vinicius Santos is a 44 year old male with PMHx of EtOH abuse who presented to the ED on 10/18/24 for help with detoxification and admitted for alcohol dependence with withdrawal.    Alcohol dependence with withdrawal:     Reports he has been drinking two gallons of Bacardi rum and whiskey daily for the past two weeks  States he has not eaten any food or drank anything besides alcohol in this time frame, last drank one bottle of Bacardi this morning  Blood alcohol level 328 on admission  S/p 1L NS bolus, famotidine 20 mg IV, ondansetron 4 mg IV, and lorazepam 2 mg IV in the ED  Phenobarbital 130 mg x 1 ordered  [facility] protocol, librium taper  Thiamine 100 mg, folic acid 1 mg, multivitamins started  Interested in abstinence from alcohol  Telemetry    Elevated alkaline phosphatase  Transaminitis  Alkphos 154, ,  on admission  SGOT/SGPT ratio of 213/149 consistent with ETOH use, hepatic steatosis seen on liver sonogram, from ETOH use.       Hypokalemia  Potassium 3.4 on admission  KCl 40 mEq ordered, empirically ordered magox 800 mg  F/u repeat K and Mag in AM Vinicius Santos is a 44 year old male with PMHx of EtOH abuse who presented to the ED on 10/18/24 for help with detoxification and admitted for alcohol dependence with withdrawal.    Alcohol dependence with withdrawal:     Reports he has been drinking two gallons of Bacardi rum and whiskey daily for the past two weeks  States he has not eaten any food or drank anything besides alcohol in this time frame, last drank one bottle of Bacardi this morning  Blood alcohol level 328 on admission  S/p 1L NS bolus, famotidine 20 mg IV, ondansetron 4 mg IV, and lorazepam 2 mg IV in the ED  Phenobarbital 130 mg x 1 ordered  CIWA protocol, librium taper  Thiamine 100 mg, folic acid 1 mg, multivitamins started  Interested in abstinence from alcohol  Telemetry    Elevated alkaline phosphatase  Transaminitis  Alkphos 154, ,  on admission  SGOT/SGPT ratio of 213/149 consistent with ETOH use, hepatic steatosis seen on liver sonogram, from ETOH use.         Can be discharged home once completes 4 day CIWA protocol.

## 2024-10-20 LAB
HAV IGM SER-ACNC: SIGNIFICANT CHANGE UP
HBV CORE IGM SER-ACNC: SIGNIFICANT CHANGE UP
HBV SURFACE AG SER-ACNC: SIGNIFICANT CHANGE UP
HCV AB S/CO SERPL IA: 0.18 S/CO — SIGNIFICANT CHANGE UP (ref 0–0.99)
HCV AB SERPL-IMP: SIGNIFICANT CHANGE UP
SMOOTH MUSCLE AB SER-ACNC: ABNORMAL

## 2024-10-20 PROCEDURE — 99233 SBSQ HOSP IP/OBS HIGH 50: CPT

## 2024-10-20 RX ORDER — CHLORDIAZEPOXIDE HCL 10 MG
25 CAPSULE ORAL ONCE
Refills: 0 | Status: DISCONTINUED | OUTPATIENT
Start: 2024-10-20 | End: 2024-10-20

## 2024-10-20 RX ADMIN — Medication 1 MILLIGRAM(S): at 10:11

## 2024-10-20 RX ADMIN — Medication 50 MILLIGRAM(S): at 13:19

## 2024-10-20 RX ADMIN — Medication 40 MILLIGRAM(S): at 05:54

## 2024-10-20 RX ADMIN — ONDANSETRON HYDROCHLORIDE 4 MILLIGRAM(S): 2 INJECTION, SOLUTION INTRAMUSCULAR; INTRAVENOUS at 02:13

## 2024-10-20 RX ADMIN — Medication 100 MILLIGRAM(S): at 11:30

## 2024-10-20 RX ADMIN — FOLIC ACID 1 MILLIGRAM(S): 1 TABLET ORAL at 11:30

## 2024-10-20 RX ADMIN — Medication 50 MILLIGRAM(S): at 05:54

## 2024-10-20 RX ADMIN — Medication 1 TABLET(S): at 11:30

## 2024-10-20 RX ADMIN — Medication 25 MILLIGRAM(S): at 22:39

## 2024-10-20 RX ADMIN — Medication 3 MILLIGRAM(S): at 21:33

## 2024-10-20 NOTE — PROGRESS NOTE ADULT - ASSESSMENT
Vinicius Santos is a 44 year old male with PMHx of EtOH abuse who presented to the ED on 10/18/24 for help with detoxification and admitted for alcohol dependence with withdrawal.    Alcohol dependence with withdrawal:     Reports he has been drinking two gallons of Bacardi rum and whiskey daily for the past two weeks  States he has not eaten any food or drank anything besides alcohol in this time frame, last drank one bottle of Bacardi this morning  Blood alcohol level 328 on admission  S/p 1L NS bolus, famotidine 20 mg IV, ondansetron 4 mg IV, and lorazepam 2 mg IV in the ED  Phenobarbital 130 mg x 1 ordered  CIWA protocol, librium taper  Thiamine 100 mg, folic acid 1 mg, multivitamins started  Interested in abstinence from alcohol  Telemetry    Elevated alkaline phosphatase  Transaminitis  Alkphos 154, ,  on admission  SGOT/SGPT ratio of 213/149 consistent with ETOH use, hepatic steatosis seen on liver sonogram, from ETOH use.         Can be discharged home once completes 4 day CIWA protocol.

## 2024-10-20 NOTE — PROGRESS NOTE ADULT - SUBJECTIVE AND OBJECTIVE BOX
Patient is a 44y old  Male who presents with a chief complaint of Alcohol dependence with withdrawal (19 Oct 2024 11:43)    INTERVAL HPI/OVERNIGHT EVENTS: Patients seen and examined at bedside this morning. No acute events overnight. Pt reports    MEDICATIONS  (STANDING):  chlordiazePOXIDE 50 milliGRAM(s) Oral every 8 hours  chlordiazePOXIDE   Oral   enoxaparin Injectable 40 milliGRAM(s) SubCutaneous every 24 hours  folic acid 1 milliGRAM(s) Oral daily  multivitamin 1 Tablet(s) Oral daily  thiamine 100 milliGRAM(s) Oral daily    MEDICATIONS  (PRN):  acetaminophen     Tablet .. 650 milliGRAM(s) Oral every 6 hours PRN Temp greater or equal to 38C (100.4F), Mild Pain (1 - 3)  aluminum hydroxide/magnesium hydroxide/simethicone Suspension 30 milliLiter(s) Oral every 4 hours PRN Dyspepsia  LORazepam   Injectable 1 milliGRAM(s) IV Push every 4 hours PRN CIWA-Ar score 8 or greater  melatonin 3 milliGRAM(s) Oral at bedtime PRN Insomnia  ondansetron Injectable 4 milliGRAM(s) IV Push every 8 hours PRN Nausea and/or Vomiting    Allergies    No Known Allergies    Intolerances      REVIEW OF SYSTEMS:  All other systems reviewed and are negative    Vital Signs Last 24 Hrs  T(C): 36.3 (20 Oct 2024 05:14), Max: 37.2 (19 Oct 2024 10:10)  T(F): 97.3 (20 Oct 2024 05:14), Max: 99 (19 Oct 2024 10:10)  HR: 82 (20 Oct 2024 05:14) (81 - 91)  BP: 137/93 (20 Oct 2024 05:14) (110/80 - 154/104)  BP(mean): --  RR: 16 (20 Oct 2024 05:14) (14 - 18)  SpO2: 95% (20 Oct 2024 05:14) (94% - 96%)    Parameters below as of 20 Oct 2024 05:14  Patient On (Oxygen Delivery Method): room air      Daily     Daily   I&O's Summary    19 Oct 2024 07:01  -  20 Oct 2024 07:00  --------------------------------------------------------  IN: 470 mL / OUT: 500 mL / NET: -30 mL      CAPILLARY BLOOD GLUCOSE        PHYSICAL EXAM:  GENERAL: stable, in bed, mood controlled, no anxiety  NECK: supple, No JVD  CHEST/LUNG: clear to auscultation bilaterally; no rales, rhonchi, or wheezing b/l  HEART: normal S1, S2  ABDOMEN: BS+, soft, ND, NT   EXTREMITIES:  pulses palpable; no clubbing, cyanosis, or edema b/l LEs    Labs                          15.7   4.30  )-----------( 163      ( 18 Oct 2024 16:04 )             47.6     10-19    139  |  101  |  8   ----------------------------<  87  3.5   |  27  |  0.76    Ca    8.7      19 Oct 2024 07:00  Mg     1.9     10-19    TPro  8.3  /  Alb  3.9  /  TBili  0.9  /  DBili  0.3  /  AST  213[H]  /  ALT  149[H]  /  AlkPhos  150[H]  10-19          Urinalysis Basic - ( 19 Oct 2024 07:00 )    Color: x / Appearance: x / SG: x / pH: x  Gluc: 87 mg/dL / Ketone: x  / Bili: x / Urobili: x   Blood: x / Protein: x / Nitrite: x   Leuk Esterase: x / RBC: x / WBC x   Sq Epi: x / Non Sq Epi: x / Bacteria: x                      Radiology and Imaging reviewed. Patient is a 44y old  Male who presents with a chief complaint of Alcohol dependence with withdrawal (19 Oct 2024 11:43)    INTERVAL HPI/OVERNIGHT EVENTS: Patients seen and examined at bedside this morning. No acute events overnight. Pt reports hands shaking.    MEDICATIONS  (STANDING):  chlordiazePOXIDE 50 milliGRAM(s) Oral every 8 hours  chlordiazePOXIDE   Oral   enoxaparin Injectable 40 milliGRAM(s) SubCutaneous every 24 hours  folic acid 1 milliGRAM(s) Oral daily  multivitamin 1 Tablet(s) Oral daily  thiamine 100 milliGRAM(s) Oral daily    MEDICATIONS  (PRN):  acetaminophen     Tablet .. 650 milliGRAM(s) Oral every 6 hours PRN Temp greater or equal to 38C (100.4F), Mild Pain (1 - 3)  aluminum hydroxide/magnesium hydroxide/simethicone Suspension 30 milliLiter(s) Oral every 4 hours PRN Dyspepsia  LORazepam   Injectable 1 milliGRAM(s) IV Push every 4 hours PRN CIWA-Ar score 8 or greater  melatonin 3 milliGRAM(s) Oral at bedtime PRN Insomnia  ondansetron Injectable 4 milliGRAM(s) IV Push every 8 hours PRN Nausea and/or Vomiting    Allergies    No Known Allergies    Intolerances      REVIEW OF SYSTEMS:  All other systems reviewed and are negative    Vital Signs Last 24 Hrs  T(C): 36.3 (20 Oct 2024 05:14), Max: 37.2 (19 Oct 2024 10:10)  T(F): 97.3 (20 Oct 2024 05:14), Max: 99 (19 Oct 2024 10:10)  HR: 82 (20 Oct 2024 05:14) (81 - 91)  BP: 137/93 (20 Oct 2024 05:14) (110/80 - 154/104)  BP(mean): --  RR: 16 (20 Oct 2024 05:14) (14 - 18)  SpO2: 95% (20 Oct 2024 05:14) (94% - 96%)    Parameters below as of 20 Oct 2024 05:14  Patient On (Oxygen Delivery Method): room air      Daily     Daily   I&O's Summary    19 Oct 2024 07:01  -  20 Oct 2024 07:00  --------------------------------------------------------  IN: 470 mL / OUT: 500 mL / NET: -30 mL      CAPILLARY BLOOD GLUCOSE        PHYSICAL EXAM:  GENERAL: stable, in bed, mood controlled, no anxiety  NECK: supple, No JVD  CHEST/LUNG: clear to auscultation bilaterally; no rales, rhonchi, or wheezing b/l  HEART: normal S1, S2  ABDOMEN: BS+, soft, ND, NT   EXTREMITIES:  pulses palpable; no clubbing, cyanosis, or edema b/l LEs    Labs                          15.7   4.30  )-----------( 163      ( 18 Oct 2024 16:04 )             47.6     10-19    139  |  101  |  8   ----------------------------<  87  3.5   |  27  |  0.76    Ca    8.7      19 Oct 2024 07:00  Mg     1.9     10-19    TPro  8.3  /  Alb  3.9  /  TBili  0.9  /  DBili  0.3  /  AST  213[H]  /  ALT  149[H]  /  AlkPhos  150[H]  10-19          Urinalysis Basic - ( 19 Oct 2024 07:00 )    Color: x / Appearance: x / SG: x / pH: x  Gluc: 87 mg/dL / Ketone: x  / Bili: x / Urobili: x   Blood: x / Protein: x / Nitrite: x   Leuk Esterase: x / RBC: x / WBC x   Sq Epi: x / Non Sq Epi: x / Bacteria: x                      Radiology and Imaging reviewed.

## 2024-10-21 ENCOUNTER — TRANSCRIPTION ENCOUNTER (OUTPATIENT)
Age: 44
End: 2024-10-21

## 2024-10-21 VITALS
OXYGEN SATURATION: 96 % | TEMPERATURE: 98 F | HEART RATE: 99 BPM | SYSTOLIC BLOOD PRESSURE: 106 MMHG | RESPIRATION RATE: 17 BRPM | DIASTOLIC BLOOD PRESSURE: 73 MMHG

## 2024-10-21 LAB
ALBUMIN SERPL ELPH-MCNC: 3.4 G/DL — SIGNIFICANT CHANGE UP (ref 3.3–5)
ALP SERPL-CCNC: 162 U/L — HIGH (ref 40–120)
ALT FLD-CCNC: 110 U/L — HIGH (ref 12–78)
ANION GAP SERPL CALC-SCNC: 7 MMOL/L — SIGNIFICANT CHANGE UP (ref 5–17)
AST SERPL-CCNC: 141 U/L — HIGH (ref 15–37)
BILIRUB SERPL-MCNC: 1.3 MG/DL — HIGH (ref 0.2–1.2)
BUN SERPL-MCNC: 18 MG/DL — SIGNIFICANT CHANGE UP (ref 7–23)
CALCIUM SERPL-MCNC: 8.7 MG/DL — SIGNIFICANT CHANGE UP (ref 8.5–10.1)
CHLORIDE SERPL-SCNC: 101 MMOL/L — SIGNIFICANT CHANGE UP (ref 96–108)
CO2 SERPL-SCNC: 27 MMOL/L — SIGNIFICANT CHANGE UP (ref 22–31)
CREAT SERPL-MCNC: 0.78 MG/DL — SIGNIFICANT CHANGE UP (ref 0.5–1.3)
EGFR: 112 ML/MIN/1.73M2 — SIGNIFICANT CHANGE UP
GLUCOSE SERPL-MCNC: 107 MG/DL — HIGH (ref 70–99)
HCT VFR BLD CALC: 47.4 % — SIGNIFICANT CHANGE UP (ref 39–50)
HGB BLD-MCNC: 15.4 G/DL — SIGNIFICANT CHANGE UP (ref 13–17)
MCHC RBC-ENTMCNC: 29.9 PG — SIGNIFICANT CHANGE UP (ref 27–34)
MCHC RBC-ENTMCNC: 32.5 G/DL — SIGNIFICANT CHANGE UP (ref 32–36)
MCV RBC AUTO: 92 FL — SIGNIFICANT CHANGE UP (ref 80–100)
NRBC # BLD: 0 /100 WBCS — SIGNIFICANT CHANGE UP (ref 0–0)
PLATELET # BLD AUTO: 123 K/UL — LOW (ref 150–400)
POTASSIUM SERPL-MCNC: 3.3 MMOL/L — LOW (ref 3.5–5.3)
POTASSIUM SERPL-SCNC: 3.3 MMOL/L — LOW (ref 3.5–5.3)
PROT SERPL-MCNC: 7.7 GM/DL — SIGNIFICANT CHANGE UP (ref 6–8.3)
RBC # BLD: 5.15 M/UL — SIGNIFICANT CHANGE UP (ref 4.2–5.8)
RBC # FLD: 13.2 % — SIGNIFICANT CHANGE UP (ref 10.3–14.5)
SODIUM SERPL-SCNC: 135 MMOL/L — SIGNIFICANT CHANGE UP (ref 135–145)
WBC # BLD: 6.25 K/UL — SIGNIFICANT CHANGE UP (ref 3.8–10.5)
WBC # FLD AUTO: 6.25 K/UL — SIGNIFICANT CHANGE UP (ref 3.8–10.5)

## 2024-10-21 PROCEDURE — 99239 HOSP IP/OBS DSCHRG MGMT >30: CPT

## 2024-10-21 RX ORDER — POTASSIUM CHLORIDE 10 MEQ
40 TABLET, EXTENDED RELEASE ORAL EVERY 4 HOURS
Refills: 0 | Status: COMPLETED | OUTPATIENT
Start: 2024-10-21 | End: 2024-10-21

## 2024-10-21 RX ORDER — THIAMINE HCL 100 MG
1 TABLET ORAL
Qty: 0 | Refills: 0 | DISCHARGE
Start: 2024-10-21

## 2024-10-21 RX ORDER — FOLIC ACID 1 MG/1
1 TABLET ORAL
Qty: 0 | Refills: 0 | DISCHARGE
Start: 2024-10-21

## 2024-10-21 RX ORDER — B-COMPLEX WITH VITAMIN C
1 VIAL (ML) INJECTION
Qty: 0 | Refills: 0 | DISCHARGE
Start: 2024-10-21

## 2024-10-21 RX ADMIN — Medication 50 MILLIGRAM(S): at 06:01

## 2024-10-21 RX ADMIN — Medication 1 TABLET(S): at 11:30

## 2024-10-21 RX ADMIN — Medication 40 MILLIGRAM(S): at 06:00

## 2024-10-21 RX ADMIN — FOLIC ACID 1 MILLIGRAM(S): 1 TABLET ORAL at 11:30

## 2024-10-21 RX ADMIN — Medication 40 MILLIEQUIVALENT(S): at 14:04

## 2024-10-21 RX ADMIN — Medication 100 MILLIGRAM(S): at 11:28

## 2024-10-21 RX ADMIN — Medication 40 MILLIEQUIVALENT(S): at 11:29

## 2024-10-21 NOTE — DISCHARGE NOTE PROVIDER - HOSPITAL COURSE
Vinicius Santos is a 44 year old male with PMHx of EtOH abuse who presented to the ED on 10/18/24 for help with detoxification and admitted for alcohol dependence with withdrawal.    Alcohol dependence with withdrawal  Transaminitis  Hypokalemia    On 10/21/24 this case was reviewed with Dr. Ghotra, the patient is medically stable and optimized for discharge. All medications were reviewed and appropriate prescriptions were sent to mutually agreed upon pharmacy.

## 2024-10-21 NOTE — DISCHARGE NOTE NURSING/CASE MANAGEMENT/SOCIAL WORK - FINANCIAL ASSISTANCE
[facility] provides services at a reduced cost to those who are determined to be eligible through [facility]’s financial assistance program. Information regarding [facility]’s financial assistance program can be found by going to [email or web address] or by calling [phone].

## 2024-10-21 NOTE — DISCHARGE NOTE NURSING/CASE MANAGEMENT/SOCIAL WORK - PATIENT PORTAL LINK FT
You can access the FollowMyHealth Patient Portal offered by [facility] by registering at the following website: [email or web address]. By joining [employer]’s FollowMyHealth portal, you will also be able to view your health information using other applications (apps) compatible with our system.

## 2024-10-21 NOTE — DISCHARGE NOTE PROVIDER - CARE PROVIDERS DIRECT ADDRESSES
[email or web address]

## 2024-10-21 NOTE — DISCHARGE NOTE NURSING/CASE MANAGEMENT/SOCIAL WORK - NSDCPEPT PROEDMA_GEN_ALL_CORE
Body Location Override (Optional - Billing Will Still Be Based On Selected Body Map Location If Applicable): right lateral elbow Detail Level: Detailed Depth Of Biopsy: dermis Was A Bandage Applied: Yes Size Of Lesion In Cm: 0.6 Biopsy Type: H and E Biopsy Method: Dermablade Anesthesia Type: 2% lidocaine with epinephrine Anesthesia Volume In Cc (Will Not Render If 0): 1 Additional Anesthesia Volume In Cc (Will Not Render If 0): 0 Hemostasis: Drysol Wound Care: Petrolatum Yes Dressing: bandage Destruction After The Procedure: No Type Of Destruction Used: Curettage Curettage Text: The wound bed was treated with curettage after the biopsy was performed. Cryotherapy Text: The wound bed was treated with cryotherapy after the biopsy was performed. Electrodesiccation Text: The wound bed was treated with electrodesiccation after the biopsy was performed. Electrodesiccation And Curettage Text: The wound bed was treated with electrodesiccation and curettage after the biopsy was performed. Silver Nitrate Text: The wound bed was treated with silver nitrate after the biopsy was performed. Lab: [id number] Lab Facility: [address] [place] [address] Path Notes (To The Dermatopathologist): 0.6cm x 0.6cm R/o BCC vs SCC Consent: Written consent was obtained and risks were reviewed including but not limited to scarring, infection, bleeding, scabbing, incomplete removal, nerve damage and allergy to anesthesia. Post-Care Instructions: I reviewed with the patient in detail post-care instructions. Patient is to keep the biopsy site dry overnight, and then apply bacitracin twice daily until healed. Patient may apply hydrogen peroxide soaks to remove any crusting. Notification Instructions: Patient will be notified of biopsy results. However, patient instructed to call the office if not contacted within 2 weeks. Billing Type: [employer] Information: Selecting Yes will display possible errors in your note based on the variables you have selected. This validation is only offered as a suggestion for you. PLEASE NOTE THAT THE VALIDATION TEXT WILL BE REMOVED WHEN YOU FINALIZE YOUR NOTE. IF YOU WANT TO FAX A PRELIMINARY NOTE YOU WILL NEED TO TOGGLE THIS TO 'NO' IF YOU DO NOT WANT IT IN YOUR FAXED NOTE. Body Location Override (Optional - Billing Will Still Be Based On Selected Body Map Location If Applicable): right superior shoulder Lab: [id number] Lab Facility: [id number] Billing Type: Third-Party Bill

## 2024-10-21 NOTE — DISCHARGE NOTE PROVIDER - CARE PROVIDER_API CALL
Leslee Alfaro  Surgery  [address] [place] [address]  Phone: [phone]  Fax: [phone]  Follow Up Time:     Jovani Sifuentes  Gastroenterology  [address] [facility], [facility] [address]  [place] [address]  Phone: [phone]  Fax: [phone]  Follow Up Time:

## 2024-10-21 NOTE — DISCHARGE NOTE PROVIDER - NSDCDCMDCOMP_GEN_ALL_CORE
Spoke with patient advised Dr. Peck is out of the office and she will be contacted next week with results. Pt verbalized understanding.      This document is complete and the patient is ready for discharge.

## 2024-10-25 DIAGNOSIS — F10.239 ALCOHOL DEPENDENCE WITH WITHDRAWAL, UNSPECIFIED: ICD-10-CM

## 2024-10-25 DIAGNOSIS — R74.01 ELEVATION OF LEVELS OF LIVER TRANSAMINASE LEVELS: ICD-10-CM

## 2024-10-25 DIAGNOSIS — E87.6 HYPOKALEMIA: ICD-10-CM

## 2024-12-05 NOTE — PATIENT PROFILE ADULT - NSPROHMSYMPCOND_GEN_A_NUR
Gastroenterology at [facility]  Gastroenterology  [address] [place] [address]  Phone: [phone]  Fax:     [facility]  Gastroenterology  [address] [place] [address]  Phone: [phone]  Fax: [phone]     none

## 2025-04-01 ENCOUNTER — APPOINTMENT (OUTPATIENT)
Age: 45
End: 2025-04-01

## 2025-04-01 ENCOUNTER — OUTPATIENT (OUTPATIENT)
Dept: OUTPATIENT SERVICES | Facility: HOSPITAL | Age: 45
LOS: 1 days | End: 2025-04-01

## 2025-04-01 VITALS
OXYGEN SATURATION: 99 % | DIASTOLIC BLOOD PRESSURE: 88 MMHG | RESPIRATION RATE: 15 BRPM | TEMPERATURE: 98 F | HEART RATE: 70 BPM | SYSTOLIC BLOOD PRESSURE: 137 MMHG | WEIGHT: 200 LBS | BODY MASS INDEX: 32.28 KG/M2

## 2025-04-01 DIAGNOSIS — K21.9 GASTRO-ESOPHAGEAL REFLUX DISEASE W/OUT ESOPHAGITIS: ICD-10-CM

## 2025-04-01 DIAGNOSIS — R22.2 LOCALIZED SWELLING, MASS AND LUMP, TRUNK: ICD-10-CM

## 2025-04-01 PROCEDURE — 99213 OFFICE O/P EST LOW 20 MIN: CPT

## 2025-04-14 DIAGNOSIS — K21.9 GASTRO-ESOPHAGEAL REFLUX DISEASE WITHOUT ESOPHAGITIS: ICD-10-CM

## 2025-04-21 NOTE — H&P ADULT - NSHPLANGTRANSLATORFT_GEN_A_CORE
Head, normocephalic, atraumatic, Face, Face within normal limits, Ears, External ears within normal limits
Regis ID #[id number]

## 2025-05-13 ENCOUNTER — OUTPATIENT (OUTPATIENT)
Dept: OUTPATIENT SERVICES | Facility: HOSPITAL | Age: 45
LOS: 1 days | End: 2025-05-13

## 2025-05-13 ENCOUNTER — APPOINTMENT (OUTPATIENT)
Age: 45
End: 2025-05-13
Payer: SELF-PAY

## 2025-05-13 VITALS
DIASTOLIC BLOOD PRESSURE: 77 MMHG | SYSTOLIC BLOOD PRESSURE: 117 MMHG | RESPIRATION RATE: 15 BRPM | TEMPERATURE: 98.7 F | BODY MASS INDEX: 34.86 KG/M2 | WEIGHT: 216 LBS | OXYGEN SATURATION: 97 % | HEART RATE: 94 BPM

## 2025-05-13 DIAGNOSIS — Z13.1 ENCOUNTER FOR SCREENING FOR DIABETES MELLITUS: ICD-10-CM

## 2025-05-13 DIAGNOSIS — Z13.220 ENCOUNTER FOR SCREENING FOR LIPOID DISORDERS: ICD-10-CM

## 2025-05-13 DIAGNOSIS — Z12.11 ENCOUNTER FOR SCREENING FOR MALIGNANT NEOPLASM OF COLON: ICD-10-CM

## 2025-05-13 DIAGNOSIS — M77.32 CALCANEAL SPUR, LEFT FOOT: ICD-10-CM

## 2025-05-13 DIAGNOSIS — Z78.9 OTHER SPECIFIED HEALTH STATUS: ICD-10-CM

## 2025-05-13 DIAGNOSIS — Z00.00 ENCOUNTER FOR GENERAL ADULT MEDICAL EXAMINATION W/OUT ABNORMAL FINDINGS: ICD-10-CM

## 2025-05-13 DIAGNOSIS — H54.7 UNSPECIFIED VISUAL LOSS: ICD-10-CM

## 2025-05-13 PROCEDURE — 99396 PREV VISIT EST AGE 40-64: CPT

## 2025-05-13 PROCEDURE — 99000 SPECIMEN HANDLING OFFICE-LAB: CPT

## 2025-05-15 LAB
ALBUMIN SERPL ELPH-MCNC: 4.3 G/DL
ALP BLD-CCNC: 119 U/L
ALT SERPL-CCNC: 41 U/L
ANION GAP SERPL CALC-SCNC: 15 MMOL/L
AST SERPL-CCNC: 38 U/L
BILIRUB SERPL-MCNC: 0.5 MG/DL
BUN SERPL-MCNC: 19 MG/DL
CALCIUM SERPL-MCNC: 9.3 MG/DL
CHLORIDE SERPL-SCNC: 102 MMOL/L
CHOLEST SERPL-MCNC: 202 MG/DL
CO2 SERPL-SCNC: 23 MMOL/L
CREAT SERPL-MCNC: 0.58 MG/DL
EGFRCR SERPLBLD CKD-EPI 2021: 123 ML/MIN/1.73M2
ESTIMATED AVERAGE GLUCOSE: 120 MG/DL
GLUCOSE SERPL-MCNC: 102 MG/DL
HBA1C MFR BLD HPLC: 5.8 %
HCT VFR BLD CALC: 42 %
HDLC SERPL-MCNC: 84 MG/DL
HGB BLD-MCNC: 13.7 G/DL
LDLC SERPL-MCNC: 100 MG/DL
MCHC RBC-ENTMCNC: 31.2 PG
MCHC RBC-ENTMCNC: 32.6 G/DL
MCV RBC AUTO: 95.7 FL
NONHDLC SERPL-MCNC: 118 MG/DL
PLATELET # BLD AUTO: 283 K/UL
POTASSIUM SERPL-SCNC: 4.1 MMOL/L
PROT SERPL-MCNC: 7.1 G/DL
RBC # BLD: 4.39 M/UL
RBC # FLD: 12.7 %
SODIUM SERPL-SCNC: 140 MMOL/L
T PALLIDUM AB SER QL IA: NEGATIVE
TRIGL SERPL-MCNC: 101 MG/DL
WBC # FLD AUTO: 13.08 K/UL

## 2025-05-16 LAB
HCV AB SER QL: NONREACTIVE
HCV S/CO RATIO: 0.15 S/CO
HIV1+2 AB SPEC QL IA.RAPID: NONREACTIVE

## 2025-05-19 DIAGNOSIS — Z12.11 ENCOUNTER FOR SCREENING FOR MALIGNANT NEOPLASM OF COLON: ICD-10-CM

## 2025-05-19 DIAGNOSIS — Z13.220 ENCOUNTER FOR SCREENING FOR LIPOID DISORDERS: ICD-10-CM

## 2025-05-19 DIAGNOSIS — M77.32 CALCANEAL SPUR, LEFT FOOT: ICD-10-CM

## 2025-05-19 DIAGNOSIS — H54.7 UNSPECIFIED VISUAL LOSS: ICD-10-CM

## 2025-05-19 DIAGNOSIS — Z13.1 ENCOUNTER FOR SCREENING FOR DIABETES MELLITUS: ICD-10-CM

## 2025-05-19 DIAGNOSIS — Z00.00 ENCOUNTER FOR GENERAL ADULT MEDICAL EXAMINATION WITHOUT ABNORMAL FINDINGS: ICD-10-CM

## 2025-05-20 ENCOUNTER — OUTPATIENT (OUTPATIENT)
Dept: OUTPATIENT SERVICES | Facility: HOSPITAL | Age: 45
LOS: 1 days | End: 2025-05-20

## 2025-05-20 ENCOUNTER — APPOINTMENT (OUTPATIENT)
Age: 45
End: 2025-05-20
Payer: SELF-PAY

## 2025-05-20 VITALS
WEIGHT: 220 LBS | DIASTOLIC BLOOD PRESSURE: 84 MMHG | HEART RATE: 62 BPM | TEMPERATURE: 98.9 F | OXYGEN SATURATION: 97 % | BODY MASS INDEX: 35.36 KG/M2 | HEIGHT: 66 IN | SYSTOLIC BLOOD PRESSURE: 130 MMHG

## 2025-05-20 DIAGNOSIS — R73.03 PREDIABETES.: ICD-10-CM

## 2025-05-20 DIAGNOSIS — D72.829 ELEVATED WHITE BLOOD CELL COUNT, UNSPECIFIED: ICD-10-CM

## 2025-05-20 PROCEDURE — 99213 OFFICE O/P EST LOW 20 MIN: CPT

## 2025-05-22 DIAGNOSIS — R73.03 PREDIABETES: ICD-10-CM

## 2025-05-22 DIAGNOSIS — D72.829 ELEVATED WHITE BLOOD CELL COUNT, UNSPECIFIED: ICD-10-CM

## 2025-06-10 NOTE — DISCHARGE NOTE PROVIDER - DISCHARGE DIET
RN returned patient call. Patient name and  verified. Patient states she needs a mammogram order. Mammogram order pended to [facility]. Patient advised the order needs to be signed, but should be available by tomorrow for her to call radiology and schedule. Patient verbalized understanding. All questions and concerns addressed.  ANGIE Magdaleno  
Regular Diet - No restrictions